# Patient Record
Sex: MALE | Race: OTHER
[De-identification: names, ages, dates, MRNs, and addresses within clinical notes are randomized per-mention and may not be internally consistent; named-entity substitution may affect disease eponyms.]

---

## 2018-01-23 ENCOUNTER — HOSPITAL ENCOUNTER (EMERGENCY)
Dept: HOSPITAL 8 - ED | Age: 58
Discharge: HOME | End: 2018-01-23
Payer: COMMERCIAL

## 2018-01-23 VITALS — BODY MASS INDEX: 28.44 KG/M2 | WEIGHT: 160.5 LBS | HEIGHT: 63 IN

## 2018-01-23 VITALS — SYSTOLIC BLOOD PRESSURE: 154 MMHG | DIASTOLIC BLOOD PRESSURE: 89 MMHG

## 2018-01-23 DIAGNOSIS — R10.30: Primary | ICD-10-CM

## 2018-01-23 LAB
ALBUMIN SERPL-MCNC: 4 G/DL (ref 3.4–5)
ALP SERPL-CCNC: 119 U/L (ref 45–117)
ALT SERPL-CCNC: 34 U/L (ref 12–78)
ANION GAP SERPL CALC-SCNC: 6 MMOL/L (ref 5–15)
BASOPHILS # BLD AUTO: 0.06 X10^3/UL (ref 0–0.1)
BASOPHILS NFR BLD AUTO: 1 % (ref 0–1)
BILIRUB SERPL-MCNC: 1 MG/DL (ref 0.2–1)
CALCIUM SERPL-MCNC: 8.3 MG/DL (ref 8.5–10.1)
CHLORIDE SERPL-SCNC: 106 MMOL/L (ref 98–107)
CREAT SERPL-MCNC: 0.67 MG/DL (ref 0.7–1.3)
CULTURE INDICATED?: NO
EOSINOPHIL # BLD AUTO: 0.06 X10^3/UL (ref 0–0.4)
EOSINOPHIL NFR BLD AUTO: 1 % (ref 1–7)
ERYTHROCYTE [DISTWIDTH] IN BLOOD BY AUTOMATED COUNT: 13.9 % (ref 9.4–14.8)
LYMPHOCYTES # BLD AUTO: 1.28 X10^3/UL (ref 1–3.4)
LYMPHOCYTES NFR BLD AUTO: 13 % (ref 22–44)
MCH RBC QN AUTO: 30.8 PG (ref 27.5–34.5)
MCHC RBC AUTO-ENTMCNC: 34 G/DL (ref 33.2–36.2)
MCV RBC AUTO: 90.4 FL (ref 81–97)
MD: NO
MICROSCOPIC: (no result)
MONOCYTES # BLD AUTO: 0.58 X10^3/UL (ref 0.2–0.8)
MONOCYTES NFR BLD AUTO: 6 % (ref 2–9)
NEUTROPHILS # BLD AUTO: 7.61 X10^3/UL (ref 1.8–6.8)
NEUTROPHILS NFR BLD AUTO: 79 % (ref 42–75)
PLATELET # BLD AUTO: 242 X10^3/UL (ref 130–400)
PMV BLD AUTO: 7.5 FL (ref 7.4–10.4)
PROT SERPL-MCNC: 8.1 G/DL (ref 6.4–8.2)
RBC # BLD AUTO: 5.44 X10^6/UL (ref 4.38–5.82)

## 2018-01-23 PROCEDURE — 74177 CT ABD & PELVIS W/CONTRAST: CPT

## 2018-01-23 PROCEDURE — 96376 TX/PRO/DX INJ SAME DRUG ADON: CPT

## 2018-01-23 PROCEDURE — 85025 COMPLETE CBC W/AUTO DIFF WBC: CPT

## 2018-01-23 PROCEDURE — 96361 HYDRATE IV INFUSION ADD-ON: CPT

## 2018-01-23 PROCEDURE — 96374 THER/PROPH/DIAG INJ IV PUSH: CPT

## 2018-01-23 PROCEDURE — 81001 URINALYSIS AUTO W/SCOPE: CPT

## 2018-01-23 PROCEDURE — 36415 COLL VENOUS BLD VENIPUNCTURE: CPT

## 2018-01-23 PROCEDURE — 80053 COMPREHEN METABOLIC PANEL: CPT

## 2018-01-23 PROCEDURE — 99285 EMERGENCY DEPT VISIT HI MDM: CPT

## 2018-01-23 PROCEDURE — 96375 TX/PRO/DX INJ NEW DRUG ADDON: CPT

## 2018-01-23 RX ADMIN — MORPHINE SULFATE PRN MG: 4 INJECTION INTRAVENOUS at 11:26

## 2018-01-23 RX ADMIN — MORPHINE SULFATE PRN MG: 4 INJECTION INTRAVENOUS at 10:10

## 2018-02-01 ENCOUNTER — HOSPITAL ENCOUNTER (INPATIENT)
Dept: HOSPITAL 8 - ORIP | Age: 58
LOS: 5 days | Discharge: HOME | DRG: 331 | End: 2018-02-06
Attending: SURGERY | Admitting: SURGERY
Payer: COMMERCIAL

## 2018-02-01 VITALS — BODY MASS INDEX: 26.95 KG/M2 | HEIGHT: 63 IN | WEIGHT: 152.12 LBS

## 2018-02-01 VITALS — DIASTOLIC BLOOD PRESSURE: 98 MMHG | SYSTOLIC BLOOD PRESSURE: 159 MMHG

## 2018-02-01 DIAGNOSIS — K56.699: Primary | ICD-10-CM

## 2018-02-01 PROCEDURE — 0DBE0ZZ EXCISION OF LARGE INTESTINE, OPEN APPROACH: ICD-10-PCS | Performed by: SURGERY

## 2018-02-01 PROCEDURE — S0074 INJECTION, CEFOTETAN DISODIU: HCPCS

## 2018-02-01 PROCEDURE — 80048 BASIC METABOLIC PNL TOTAL CA: CPT

## 2018-02-01 PROCEDURE — 88307 TISSUE EXAM BY PATHOLOGIST: CPT

## 2018-02-01 PROCEDURE — 86900 BLOOD TYPING SEROLOGIC ABO: CPT

## 2018-02-01 PROCEDURE — 86850 RBC ANTIBODY SCREEN: CPT

## 2018-02-01 PROCEDURE — 85025 COMPLETE CBC W/AUTO DIFF WBC: CPT

## 2018-02-01 PROCEDURE — C1765 ADHESION BARRIER: HCPCS

## 2018-02-01 PROCEDURE — 88305 TISSUE EXAM BY PATHOLOGIST: CPT

## 2018-02-01 PROCEDURE — 83735 ASSAY OF MAGNESIUM: CPT

## 2018-02-01 PROCEDURE — 82040 ASSAY OF SERUM ALBUMIN: CPT

## 2018-02-01 PROCEDURE — 36415 COLL VENOUS BLD VENIPUNCTURE: CPT

## 2018-02-01 PROCEDURE — 81001 URINALYSIS AUTO W/SCOPE: CPT

## 2018-02-01 RX ADMIN — MEPERIDINE HYDROCHLORIDE PRN MG: 25 INJECTION, SOLUTION INTRAMUSCULAR; INTRAVENOUS; SUBCUTANEOUS at 17:40

## 2018-02-01 RX ADMIN — HYDROMORPHONE HYDROCHLORIDE PRN MG: 1 INJECTION, SOLUTION INTRAMUSCULAR; INTRAVENOUS; SUBCUTANEOUS at 18:40

## 2018-02-01 RX ADMIN — ACETAMINOPHEN SCH MG: 325 TABLET, FILM COATED ORAL at 21:55

## 2018-02-01 RX ADMIN — HYDROMORPHONE HYDROCHLORIDE PRN MG: 1 INJECTION, SOLUTION INTRAMUSCULAR; INTRAVENOUS; SUBCUTANEOUS at 18:50

## 2018-02-01 RX ADMIN — MEPERIDINE HYDROCHLORIDE PRN MG: 25 INJECTION, SOLUTION INTRAMUSCULAR; INTRAVENOUS; SUBCUTANEOUS at 19:10

## 2018-02-01 RX ADMIN — MEPERIDINE HYDROCHLORIDE PRN MG: 25 INJECTION, SOLUTION INTRAMUSCULAR; INTRAVENOUS; SUBCUTANEOUS at 19:00

## 2018-02-01 RX ADMIN — MEPERIDINE HYDROCHLORIDE PRN MG: 25 INJECTION, SOLUTION INTRAMUSCULAR; INTRAVENOUS; SUBCUTANEOUS at 18:10

## 2018-02-01 RX ADMIN — KETOROLAC TROMETHAMINE SCH MG: 30 INJECTION, SOLUTION INTRAMUSCULAR at 21:55

## 2018-02-01 RX ADMIN — HYDROMORPHONE HYDROCHLORIDE PRN MG: 1 INJECTION, SOLUTION INTRAMUSCULAR; INTRAVENOUS; SUBCUTANEOUS at 18:20

## 2018-02-02 VITALS — SYSTOLIC BLOOD PRESSURE: 138 MMHG | DIASTOLIC BLOOD PRESSURE: 85 MMHG

## 2018-02-02 VITALS — DIASTOLIC BLOOD PRESSURE: 72 MMHG | SYSTOLIC BLOOD PRESSURE: 118 MMHG

## 2018-02-02 VITALS — DIASTOLIC BLOOD PRESSURE: 53 MMHG | SYSTOLIC BLOOD PRESSURE: 92 MMHG

## 2018-02-02 VITALS — DIASTOLIC BLOOD PRESSURE: 58 MMHG | SYSTOLIC BLOOD PRESSURE: 94 MMHG

## 2018-02-02 VITALS — SYSTOLIC BLOOD PRESSURE: 93 MMHG | DIASTOLIC BLOOD PRESSURE: 53 MMHG

## 2018-02-02 LAB
ALBUMIN SERPL-MCNC: 2.8 G/DL (ref 3.4–5)
ANION GAP SERPL CALC-SCNC: 9 MMOL/L (ref 5–15)
BASOPHILS # BLD AUTO: 0.01 X10^3/UL (ref 0–0.1)
BASOPHILS NFR BLD AUTO: 0 % (ref 0–1)
CALCIUM SERPL-MCNC: 7.7 MG/DL (ref 8.5–10.1)
CHLORIDE SERPL-SCNC: 101 MMOL/L (ref 98–107)
CREAT SERPL-MCNC: 0.73 MG/DL (ref 0.7–1.3)
EOSINOPHIL # BLD AUTO: 0.01 X10^3/UL (ref 0–0.4)
EOSINOPHIL NFR BLD AUTO: 0 % (ref 1–7)
ERYTHROCYTE [DISTWIDTH] IN BLOOD BY AUTOMATED COUNT: 13.6 % (ref 9.4–14.8)
LYMPHOCYTES # BLD AUTO: 0.7 X10^3/UL (ref 1–3.4)
LYMPHOCYTES NFR BLD AUTO: 7 % (ref 22–44)
MCH RBC QN AUTO: 30.6 PG (ref 27.5–34.5)
MCHC RBC AUTO-ENTMCNC: 34 G/DL (ref 33.2–36.2)
MCV RBC AUTO: 90.1 FL (ref 81–97)
MD: NO
MONOCYTES # BLD AUTO: 0.61 X10^3/UL (ref 0.2–0.8)
MONOCYTES NFR BLD AUTO: 6 % (ref 2–9)
NEUTROPHILS # BLD AUTO: 9.21 X10^3/UL (ref 1.8–6.8)
NEUTROPHILS NFR BLD AUTO: 87 % (ref 42–75)
PLATELET # BLD AUTO: 261 X10^3/UL (ref 130–400)
PMV BLD AUTO: 7.8 FL (ref 7.4–10.4)
RBC # BLD AUTO: 4.69 X10^6/UL (ref 4.38–5.82)

## 2018-02-02 RX ADMIN — MORPHINE SULFATE PRN MG: 4 INJECTION INTRAVENOUS at 06:18

## 2018-02-02 RX ADMIN — TAMSULOSIN HYDROCHLORIDE SCH MG: 0.4 CAPSULE ORAL at 10:54

## 2018-02-02 RX ADMIN — KETOROLAC TROMETHAMINE SCH MG: 30 INJECTION, SOLUTION INTRAMUSCULAR at 17:47

## 2018-02-02 RX ADMIN — ACETAMINOPHEN SCH MG: 325 TABLET, FILM COATED ORAL at 04:03

## 2018-02-02 RX ADMIN — KETOROLAC TROMETHAMINE SCH MG: 30 INJECTION, SOLUTION INTRAMUSCULAR at 04:03

## 2018-02-02 RX ADMIN — SODIUM CHLORIDE, SODIUM LACTATE, POTASSIUM CHLORIDE, AND CALCIUM CHLORIDE SCH MLS/HR: .6; .31; .03; .02 INJECTION, SOLUTION INTRAVENOUS at 11:02

## 2018-02-02 RX ADMIN — ENOXAPARIN SODIUM SCH MG: 40 INJECTION SUBCUTANEOUS at 10:55

## 2018-02-02 RX ADMIN — KETOROLAC TROMETHAMINE SCH MG: 30 INJECTION, SOLUTION INTRAMUSCULAR at 10:55

## 2018-02-02 RX ADMIN — SODIUM CHLORIDE, SODIUM LACTATE, POTASSIUM CHLORIDE, AND CALCIUM CHLORIDE SCH MLS/HR: .6; .31; .03; .02 INJECTION, SOLUTION INTRAVENOUS at 22:49

## 2018-02-02 RX ADMIN — SODIUM CHLORIDE, SODIUM LACTATE, POTASSIUM CHLORIDE, AND CALCIUM CHLORIDE SCH MLS/HR: .6; .31; .03; .02 INJECTION, SOLUTION INTRAVENOUS at 03:51

## 2018-02-02 RX ADMIN — MORPHINE SULFATE PRN MG: 4 INJECTION INTRAVENOUS at 14:19

## 2018-02-02 RX ADMIN — ACETAMINOPHEN SCH MG: 325 TABLET, FILM COATED ORAL at 17:47

## 2018-02-02 RX ADMIN — ACETAMINOPHEN SCH MG: 325 TABLET, FILM COATED ORAL at 10:55

## 2018-02-03 VITALS — SYSTOLIC BLOOD PRESSURE: 90 MMHG | DIASTOLIC BLOOD PRESSURE: 50 MMHG

## 2018-02-03 VITALS — SYSTOLIC BLOOD PRESSURE: 107 MMHG | DIASTOLIC BLOOD PRESSURE: 67 MMHG

## 2018-02-03 VITALS — SYSTOLIC BLOOD PRESSURE: 103 MMHG | DIASTOLIC BLOOD PRESSURE: 64 MMHG

## 2018-02-03 VITALS — SYSTOLIC BLOOD PRESSURE: 98 MMHG | DIASTOLIC BLOOD PRESSURE: 58 MMHG

## 2018-02-03 LAB
<PLATELET ESTIMATE>: ADEQUATE
<PLT MORPHOLOGY>: (no result)
ANION GAP SERPL CALC-SCNC: 7 MMOL/L (ref 5–15)
BAND#(MANUAL): 0.53 X10^3/UL
BILIRUB DIRECT SERPL-MCNC: NORMAL MG/DL
CALCIUM SERPL-MCNC: 7.6 MG/DL (ref 8.5–10.1)
CHLORIDE SERPL-SCNC: 101 MMOL/L (ref 98–107)
CREAT SERPL-MCNC: 0.85 MG/DL (ref 0.7–1.3)
EOS#(MANUAL): 0.18 X10^3/UL (ref 0–0.4)
EOS% (MANUAL): 1 % (ref 1–7)
ERYTHROCYTE [DISTWIDTH] IN BLOOD BY AUTOMATED COUNT: 13.6 % (ref 9.4–14.8)
LYMPH#(MANUAL): 0.88 X10^3/UL (ref 1–3.4)
LYMPHS% (MANUAL): 5 % (ref 22–44)
MCH RBC QN AUTO: 30.7 PG (ref 27.5–34.5)
MCHC RBC AUTO-ENTMCNC: 33.7 G/DL (ref 33.2–36.2)
MCV RBC AUTO: 91.3 FL (ref 81–97)
MD: YES
MICROSCOPIC: (no result)
MONOS#(MANUAL): 0.53 X10^3/UL (ref 0.3–2.7)
MONOS% (MANUAL): 3 % (ref 2–9)
NEUTS BAND NFR BLD: 3 % (ref 0–7)
PLATELET # BLD AUTO: 214 X10^3/UL (ref 130–400)
PMV BLD AUTO: 7.3 FL (ref 7.4–10.4)
RBC # BLD AUTO: 4.05 X10^6/UL (ref 4.38–5.82)
SEG#(MANUAL): 15.49 X10^3/UL (ref 1.8–6.8)
SEGS% (MANUAL): 88 % (ref 42–75)

## 2018-02-03 RX ADMIN — ACETAMINOPHEN SCH MG: 325 TABLET, FILM COATED ORAL at 05:44

## 2018-02-03 RX ADMIN — ACETAMINOPHEN SCH MG: 325 TABLET, FILM COATED ORAL at 23:54

## 2018-02-03 RX ADMIN — TAMSULOSIN HYDROCHLORIDE SCH MG: 0.4 CAPSULE ORAL at 08:03

## 2018-02-03 RX ADMIN — ENOXAPARIN SODIUM SCH MG: 40 INJECTION SUBCUTANEOUS at 11:44

## 2018-02-03 RX ADMIN — ACETAMINOPHEN SCH MG: 325 TABLET, FILM COATED ORAL at 00:09

## 2018-02-03 RX ADMIN — KETOROLAC TROMETHAMINE SCH MG: 30 INJECTION, SOLUTION INTRAMUSCULAR at 00:09

## 2018-02-03 RX ADMIN — KETOROLAC TROMETHAMINE SCH MG: 30 INJECTION, SOLUTION INTRAMUSCULAR at 23:54

## 2018-02-03 RX ADMIN — ACETAMINOPHEN SCH MG: 325 TABLET, FILM COATED ORAL at 17:52

## 2018-02-03 RX ADMIN — ACETAMINOPHEN SCH MG: 325 TABLET, FILM COATED ORAL at 11:46

## 2018-02-03 RX ADMIN — KETOROLAC TROMETHAMINE SCH MG: 30 INJECTION, SOLUTION INTRAMUSCULAR at 11:46

## 2018-02-03 RX ADMIN — KETOROLAC TROMETHAMINE SCH MG: 30 INJECTION, SOLUTION INTRAMUSCULAR at 17:52

## 2018-02-03 RX ADMIN — SODIUM CHLORIDE, SODIUM LACTATE, POTASSIUM CHLORIDE, AND CALCIUM CHLORIDE SCH MLS/HR: .6; .31; .03; .02 INJECTION, SOLUTION INTRAVENOUS at 17:52

## 2018-02-03 RX ADMIN — KETOROLAC TROMETHAMINE SCH MG: 30 INJECTION, SOLUTION INTRAMUSCULAR at 05:44

## 2018-02-03 RX ADMIN — SODIUM CHLORIDE, SODIUM LACTATE, POTASSIUM CHLORIDE, AND CALCIUM CHLORIDE SCH MLS/HR: .6; .31; .03; .02 INJECTION, SOLUTION INTRAVENOUS at 08:02

## 2018-02-04 VITALS — DIASTOLIC BLOOD PRESSURE: 72 MMHG | SYSTOLIC BLOOD PRESSURE: 115 MMHG

## 2018-02-04 VITALS — DIASTOLIC BLOOD PRESSURE: 70 MMHG | SYSTOLIC BLOOD PRESSURE: 110 MMHG

## 2018-02-04 VITALS — DIASTOLIC BLOOD PRESSURE: 71 MMHG | SYSTOLIC BLOOD PRESSURE: 115 MMHG

## 2018-02-04 VITALS — DIASTOLIC BLOOD PRESSURE: 73 MMHG | SYSTOLIC BLOOD PRESSURE: 112 MMHG

## 2018-02-04 LAB
ANION GAP SERPL CALC-SCNC: 7 MMOL/L (ref 5–15)
BASOPHILS # BLD AUTO: 0.02 X10^3/UL (ref 0–0.1)
BASOPHILS NFR BLD AUTO: 0 % (ref 0–1)
CALCIUM SERPL-MCNC: 7.9 MG/DL (ref 8.5–10.1)
CHLORIDE SERPL-SCNC: 104 MMOL/L (ref 98–107)
CREAT SERPL-MCNC: 0.68 MG/DL (ref 0.7–1.3)
EOSINOPHIL # BLD AUTO: 0.44 X10^3/UL (ref 0–0.4)
EOSINOPHIL NFR BLD AUTO: 3 % (ref 1–7)
ERYTHROCYTE [DISTWIDTH] IN BLOOD BY AUTOMATED COUNT: 13.5 % (ref 9.4–14.8)
LYMPHOCYTES # BLD AUTO: 1.26 X10^3/UL (ref 1–3.4)
LYMPHOCYTES NFR BLD AUTO: 8 % (ref 22–44)
MCH RBC QN AUTO: 30.8 PG (ref 27.5–34.5)
MCHC RBC AUTO-ENTMCNC: 34.2 G/DL (ref 33.2–36.2)
MCV RBC AUTO: 90.2 FL (ref 81–97)
MD: NO
MONOCYTES # BLD AUTO: 0.71 X10^3/UL (ref 0.2–0.8)
MONOCYTES NFR BLD AUTO: 5 % (ref 2–9)
NEUTROPHILS # BLD AUTO: 13.13 X10^3/UL (ref 1.8–6.8)
NEUTROPHILS NFR BLD AUTO: 84 % (ref 42–75)
PLATELET # BLD AUTO: 210 X10^3/UL (ref 130–400)
PMV BLD AUTO: 7.4 FL (ref 7.4–10.4)
RBC # BLD AUTO: 3.64 X10^6/UL (ref 4.38–5.82)

## 2018-02-04 RX ADMIN — KETOROLAC TROMETHAMINE SCH MG: 30 INJECTION, SOLUTION INTRAMUSCULAR at 17:53

## 2018-02-04 RX ADMIN — SODIUM CHLORIDE, PRESERVATIVE FREE SCH ML: 5 INJECTION INTRAVENOUS at 22:05

## 2018-02-04 RX ADMIN — ACETAMINOPHEN SCH MG: 325 TABLET, FILM COATED ORAL at 11:58

## 2018-02-04 RX ADMIN — OXYCODONE HYDROCHLORIDE AND ACETAMINOPHEN PRN TAB: 5; 325 TABLET ORAL at 17:53

## 2018-02-04 RX ADMIN — TAMSULOSIN HYDROCHLORIDE SCH MG: 0.4 CAPSULE ORAL at 07:51

## 2018-02-04 RX ADMIN — ACETAMINOPHEN SCH MG: 325 TABLET, FILM COATED ORAL at 23:25

## 2018-02-04 RX ADMIN — ACETAMINOPHEN SCH MG: 325 TABLET, FILM COATED ORAL at 05:53

## 2018-02-04 RX ADMIN — ENOXAPARIN SODIUM SCH MG: 40 INJECTION SUBCUTANEOUS at 11:57

## 2018-02-04 RX ADMIN — OXYCODONE HYDROCHLORIDE AND ACETAMINOPHEN PRN TAB: 5; 325 TABLET ORAL at 22:05

## 2018-02-04 RX ADMIN — SODIUM CHLORIDE, SODIUM LACTATE, POTASSIUM CHLORIDE, AND CALCIUM CHLORIDE SCH MLS/HR: .6; .31; .03; .02 INJECTION, SOLUTION INTRAVENOUS at 01:54

## 2018-02-04 RX ADMIN — ACETAMINOPHEN SCH MG: 325 TABLET, FILM COATED ORAL at 18:03

## 2018-02-04 RX ADMIN — KETOROLAC TROMETHAMINE SCH MG: 30 INJECTION, SOLUTION INTRAMUSCULAR at 11:58

## 2018-02-04 RX ADMIN — KETOROLAC TROMETHAMINE SCH MG: 30 INJECTION, SOLUTION INTRAMUSCULAR at 05:53

## 2018-02-04 RX ADMIN — OXYCODONE HYDROCHLORIDE AND ACETAMINOPHEN PRN TAB: 5; 325 TABLET ORAL at 11:58

## 2018-02-05 VITALS — SYSTOLIC BLOOD PRESSURE: 117 MMHG | DIASTOLIC BLOOD PRESSURE: 67 MMHG

## 2018-02-05 VITALS — DIASTOLIC BLOOD PRESSURE: 74 MMHG | SYSTOLIC BLOOD PRESSURE: 130 MMHG

## 2018-02-05 VITALS — DIASTOLIC BLOOD PRESSURE: 75 MMHG | SYSTOLIC BLOOD PRESSURE: 117 MMHG

## 2018-02-05 VITALS — SYSTOLIC BLOOD PRESSURE: 121 MMHG | DIASTOLIC BLOOD PRESSURE: 81 MMHG

## 2018-02-05 LAB
ANION GAP SERPL CALC-SCNC: 6 MMOL/L (ref 5–15)
BASOPHILS # BLD AUTO: 0.02 X10^3/UL (ref 0–0.1)
BASOPHILS NFR BLD AUTO: 0 % (ref 0–1)
CALCIUM SERPL-MCNC: 7.5 MG/DL (ref 8.5–10.1)
CHLORIDE SERPL-SCNC: 103 MMOL/L (ref 98–107)
CREAT SERPL-MCNC: 0.64 MG/DL (ref 0.7–1.3)
EOSINOPHIL # BLD AUTO: 0.6 X10^3/UL (ref 0–0.4)
EOSINOPHIL NFR BLD AUTO: 5 % (ref 1–7)
ERYTHROCYTE [DISTWIDTH] IN BLOOD BY AUTOMATED COUNT: 13.8 % (ref 9.4–14.8)
LYMPHOCYTES # BLD AUTO: 1.25 X10^3/UL (ref 1–3.4)
LYMPHOCYTES NFR BLD AUTO: 11 % (ref 22–44)
MCH RBC QN AUTO: 30.9 PG (ref 27.5–34.5)
MCHC RBC AUTO-ENTMCNC: 33.8 G/DL (ref 33.2–36.2)
MCV RBC AUTO: 91.3 FL (ref 81–97)
MD: NO
MONOCYTES # BLD AUTO: 0.81 X10^3/UL (ref 0.2–0.8)
MONOCYTES NFR BLD AUTO: 7 % (ref 2–9)
NEUTROPHILS # BLD AUTO: 8.51 X10^3/UL (ref 1.8–6.8)
NEUTROPHILS NFR BLD AUTO: 76 % (ref 42–75)
PLATELET # BLD AUTO: 260 X10^3/UL (ref 130–400)
PMV BLD AUTO: 7.1 FL (ref 7.4–10.4)
RBC # BLD AUTO: 3.64 X10^6/UL (ref 4.38–5.82)

## 2018-02-05 RX ADMIN — ACETAMINOPHEN SCH MG: 325 TABLET, FILM COATED ORAL at 05:23

## 2018-02-05 RX ADMIN — OXYCODONE HYDROCHLORIDE AND ACETAMINOPHEN PRN TAB: 5; 325 TABLET ORAL at 16:53

## 2018-02-05 RX ADMIN — SODIUM CHLORIDE, PRESERVATIVE FREE SCH ML: 5 INJECTION INTRAVENOUS at 08:33

## 2018-02-05 RX ADMIN — ACETAMINOPHEN SCH MG: 325 TABLET, FILM COATED ORAL at 17:58

## 2018-02-05 RX ADMIN — SODIUM CHLORIDE, PRESERVATIVE FREE SCH ML: 5 INJECTION INTRAVENOUS at 20:41

## 2018-02-05 RX ADMIN — ACETAMINOPHEN SCH MG: 325 TABLET, FILM COATED ORAL at 12:12

## 2018-02-05 RX ADMIN — TAMSULOSIN HYDROCHLORIDE SCH MG: 0.4 CAPSULE ORAL at 08:33

## 2018-02-05 RX ADMIN — POTASSIUM CHLORIDE SCH MEQ: 20 TABLET, EXTENDED RELEASE ORAL at 20:41

## 2018-02-05 RX ADMIN — POTASSIUM CHLORIDE SCH MEQ: 20 TABLET, EXTENDED RELEASE ORAL at 11:19

## 2018-02-05 RX ADMIN — OXYCODONE HYDROCHLORIDE AND ACETAMINOPHEN PRN TAB: 5; 325 TABLET ORAL at 12:21

## 2018-02-05 RX ADMIN — OXYCODONE HYDROCHLORIDE AND ACETAMINOPHEN PRN TAB: 5; 325 TABLET ORAL at 06:06

## 2018-02-05 RX ADMIN — ENOXAPARIN SODIUM SCH MG: 40 INJECTION SUBCUTANEOUS at 11:19

## 2018-02-06 VITALS — SYSTOLIC BLOOD PRESSURE: 113 MMHG | DIASTOLIC BLOOD PRESSURE: 71 MMHG

## 2018-02-06 VITALS — SYSTOLIC BLOOD PRESSURE: 132 MMHG | DIASTOLIC BLOOD PRESSURE: 78 MMHG

## 2018-02-06 VITALS — SYSTOLIC BLOOD PRESSURE: 105 MMHG | DIASTOLIC BLOOD PRESSURE: 63 MMHG

## 2018-02-06 LAB
ANION GAP SERPL CALC-SCNC: 7 MMOL/L (ref 5–15)
BASOPHILS # BLD AUTO: 0.02 X10^3/UL (ref 0–0.1)
BASOPHILS NFR BLD AUTO: 0 % (ref 0–1)
CALCIUM SERPL-MCNC: 8 MG/DL (ref 8.5–10.1)
CHLORIDE SERPL-SCNC: 103 MMOL/L (ref 98–107)
CREAT SERPL-MCNC: 0.64 MG/DL (ref 0.7–1.3)
EOSINOPHIL # BLD AUTO: 0.46 X10^3/UL (ref 0–0.4)
EOSINOPHIL NFR BLD AUTO: 6 % (ref 1–7)
ERYTHROCYTE [DISTWIDTH] IN BLOOD BY AUTOMATED COUNT: 13.8 % (ref 9.4–14.8)
LYMPHOCYTES # BLD AUTO: 1.01 X10^3/UL (ref 1–3.4)
LYMPHOCYTES NFR BLD AUTO: 13 % (ref 22–44)
MCH RBC QN AUTO: 30.6 PG (ref 27.5–34.5)
MCHC RBC AUTO-ENTMCNC: 34 G/DL (ref 33.2–36.2)
MCV RBC AUTO: 90.1 FL (ref 81–97)
MD: NO
MONOCYTES # BLD AUTO: 0.59 X10^3/UL (ref 0.2–0.8)
MONOCYTES NFR BLD AUTO: 8 % (ref 2–9)
NEUTROPHILS # BLD AUTO: 5.55 X10^3/UL (ref 1.8–6.8)
NEUTROPHILS NFR BLD AUTO: 73 % (ref 42–75)
PLATELET # BLD AUTO: 317 X10^3/UL (ref 130–400)
PMV BLD AUTO: 7.1 FL (ref 7.4–10.4)
RBC # BLD AUTO: 3.94 X10^6/UL (ref 4.38–5.82)

## 2018-02-06 RX ADMIN — ACETAMINOPHEN SCH MG: 325 TABLET, FILM COATED ORAL at 11:27

## 2018-02-06 RX ADMIN — OXYCODONE HYDROCHLORIDE AND ACETAMINOPHEN PRN TAB: 5; 325 TABLET ORAL at 04:45

## 2018-02-06 RX ADMIN — ACETAMINOPHEN SCH MG: 325 TABLET, FILM COATED ORAL at 06:00

## 2018-02-06 RX ADMIN — ACETAMINOPHEN SCH MG: 325 TABLET, FILM COATED ORAL at 00:00

## 2018-02-06 RX ADMIN — POTASSIUM CHLORIDE SCH MEQ: 20 TABLET, EXTENDED RELEASE ORAL at 08:54

## 2018-02-06 RX ADMIN — SODIUM CHLORIDE, PRESERVATIVE FREE SCH ML: 5 INJECTION INTRAVENOUS at 08:54

## 2018-02-06 RX ADMIN — OXYCODONE HYDROCHLORIDE AND ACETAMINOPHEN PRN TAB: 5; 325 TABLET ORAL at 15:52

## 2018-02-06 RX ADMIN — ENOXAPARIN SODIUM SCH MG: 40 INJECTION SUBCUTANEOUS at 11:27

## 2018-02-06 RX ADMIN — TAMSULOSIN HYDROCHLORIDE SCH MG: 0.4 CAPSULE ORAL at 08:54

## 2018-02-06 RX ADMIN — ACETAMINOPHEN SCH MG: 325 TABLET, FILM COATED ORAL at 17:22

## 2018-02-09 ENCOUNTER — HOSPITAL ENCOUNTER (INPATIENT)
Dept: HOSPITAL 8 - OR | Age: 58
LOS: 11 days | Discharge: NURSING, REHAB OR LONG-TERM CARE | DRG: 853 | End: 2018-02-20
Attending: SURGERY | Admitting: SURGERY
Payer: COMMERCIAL

## 2018-02-09 VITALS — BODY MASS INDEX: 23.87 KG/M2 | WEIGHT: 134.7 LBS | HEIGHT: 63 IN

## 2018-02-09 DIAGNOSIS — Z87.891: ICD-10-CM

## 2018-02-09 DIAGNOSIS — B96.6: ICD-10-CM

## 2018-02-09 DIAGNOSIS — E46: ICD-10-CM

## 2018-02-09 DIAGNOSIS — K40.90: ICD-10-CM

## 2018-02-09 DIAGNOSIS — K66.0: ICD-10-CM

## 2018-02-09 DIAGNOSIS — K56.699: ICD-10-CM

## 2018-02-09 DIAGNOSIS — K65.1: ICD-10-CM

## 2018-02-09 DIAGNOSIS — Z90.49: ICD-10-CM

## 2018-02-09 DIAGNOSIS — A41.9: Primary | ICD-10-CM

## 2018-02-09 DIAGNOSIS — D63.8: ICD-10-CM

## 2018-02-09 DIAGNOSIS — K55.049: ICD-10-CM

## 2018-02-09 DIAGNOSIS — Z93.3: ICD-10-CM

## 2018-02-09 DIAGNOSIS — Z86.73: ICD-10-CM

## 2018-02-09 DIAGNOSIS — K59.39: ICD-10-CM

## 2018-02-09 LAB
ALBUMIN SERPL-MCNC: 2.6 G/DL (ref 3.4–5)
ALP SERPL-CCNC: 101 U/L (ref 45–117)
ALT SERPL-CCNC: 27 U/L (ref 12–78)
ANION GAP SERPL CALC-SCNC: 8 MMOL/L (ref 5–15)
BASOPHILS # BLD AUTO: 0.04 X10^3/UL (ref 0–0.1)
BASOPHILS NFR BLD AUTO: 0 % (ref 0–1)
BILIRUB SERPL-MCNC: 0.5 MG/DL (ref 0.2–1)
CALCIUM SERPL-MCNC: 8.2 MG/DL (ref 8.5–10.1)
CHLORIDE SERPL-SCNC: 101 MMOL/L (ref 98–107)
CREAT SERPL-MCNC: 0.69 MG/DL (ref 0.7–1.3)
CULTURE INDICATED?: NO
EOSINOPHIL # BLD AUTO: 0.34 X10^3/UL (ref 0–0.4)
EOSINOPHIL NFR BLD AUTO: 3 % (ref 1–7)
ERYTHROCYTE [DISTWIDTH] IN BLOOD BY AUTOMATED COUNT: 13.3 % (ref 9.4–14.8)
LYMPHOCYTES # BLD AUTO: 1.14 X10^3/UL (ref 1–3.4)
LYMPHOCYTES NFR BLD AUTO: 8 % (ref 22–44)
MCH RBC QN AUTO: 30.5 PG (ref 27.5–34.5)
MCHC RBC AUTO-ENTMCNC: 33.8 G/DL (ref 33.2–36.2)
MCV RBC AUTO: 90.1 FL (ref 81–97)
MD: NO
MICROSCOPIC: (no result)
MONOCYTES # BLD AUTO: 0.59 X10^3/UL (ref 0.2–0.8)
MONOCYTES NFR BLD AUTO: 4 % (ref 2–9)
NEUTROPHILS # BLD AUTO: 11.57 X10^3/UL (ref 1.8–6.8)
NEUTROPHILS NFR BLD AUTO: 85 % (ref 42–75)
PLATELET # BLD AUTO: 563 X10^3/UL (ref 130–400)
PMV BLD AUTO: 6.3 FL (ref 7.4–10.4)
PROT SERPL-MCNC: 7.1 G/DL (ref 6.4–8.2)
RBC # BLD AUTO: 4.35 X10^6/UL (ref 4.38–5.82)

## 2018-02-09 PROCEDURE — 87116 MYCOBACTERIA CULTURE: CPT

## 2018-02-09 PROCEDURE — 88307 TISSUE EXAM BY PATHOLOGIST: CPT

## 2018-02-09 PROCEDURE — 49406 IMAGE CATH FLUID PERI/RETRO: CPT

## 2018-02-09 PROCEDURE — 85025 COMPLETE CBC W/AUTO DIFF WBC: CPT

## 2018-02-09 PROCEDURE — 49405 IMAGE CATH FLUID COLXN VISC: CPT

## 2018-02-09 PROCEDURE — 87040 BLOOD CULTURE FOR BACTERIA: CPT

## 2018-02-09 PROCEDURE — 80053 COMPREHEN METABOLIC PANEL: CPT

## 2018-02-09 PROCEDURE — 85651 RBC SED RATE NONAUTOMATED: CPT

## 2018-02-09 PROCEDURE — 83735 ASSAY OF MAGNESIUM: CPT

## 2018-02-09 PROCEDURE — 81003 URINALYSIS AUTO W/O SCOPE: CPT

## 2018-02-09 PROCEDURE — 83690 ASSAY OF LIPASE: CPT

## 2018-02-09 PROCEDURE — 84100 ASSAY OF PHOSPHORUS: CPT

## 2018-02-09 PROCEDURE — 36415 COLL VENOUS BLD VENIPUNCTURE: CPT

## 2018-02-09 PROCEDURE — 87077 CULTURE AEROBIC IDENTIFY: CPT

## 2018-02-09 PROCEDURE — 82962 GLUCOSE BLOOD TEST: CPT

## 2018-02-09 PROCEDURE — 96365 THER/PROPH/DIAG IV INF INIT: CPT

## 2018-02-09 PROCEDURE — S0028 INJECTION, FAMOTIDINE, 20 MG: HCPCS

## 2018-02-09 PROCEDURE — C1729 CATH, DRAINAGE: HCPCS

## 2018-02-09 PROCEDURE — 87015 SPECIMEN INFECT AGNT CONCNTJ: CPT

## 2018-02-09 PROCEDURE — 36569 INSJ PICC 5 YR+ W/O IMAGING: CPT

## 2018-02-09 PROCEDURE — C1769 GUIDE WIRE: HCPCS

## 2018-02-09 PROCEDURE — 87186 SC STD MICRODIL/AGAR DIL: CPT

## 2018-02-09 PROCEDURE — 84134 ASSAY OF PREALBUMIN: CPT

## 2018-02-09 PROCEDURE — 76937 US GUIDE VASCULAR ACCESS: CPT

## 2018-02-09 PROCEDURE — 83605 ASSAY OF LACTIC ACID: CPT

## 2018-02-09 PROCEDURE — 77001 FLUOROGUIDE FOR VEIN DEVICE: CPT

## 2018-02-09 PROCEDURE — 86140 C-REACTIVE PROTEIN: CPT

## 2018-02-09 PROCEDURE — 87075 CULTR BACTERIA EXCEPT BLOOD: CPT

## 2018-02-09 PROCEDURE — 87070 CULTURE OTHR SPECIMN AEROBIC: CPT

## 2018-02-09 PROCEDURE — 84478 ASSAY OF TRIGLYCERIDES: CPT

## 2018-02-09 PROCEDURE — 87206 SMEAR FLUORESCENT/ACID STAI: CPT

## 2018-02-09 PROCEDURE — C1751 CATH, INF, PER/CENT/MIDLINE: HCPCS

## 2018-02-09 PROCEDURE — 71045 X-RAY EXAM CHEST 1 VIEW: CPT

## 2018-02-09 PROCEDURE — 74177 CT ABD & PELVIS W/CONTRAST: CPT

## 2018-02-09 PROCEDURE — 87076 CULTURE ANAEROBE IDENT EACH: CPT

## 2018-02-09 PROCEDURE — 80048 BASIC METABOLIC PNL TOTAL CA: CPT

## 2018-02-09 PROCEDURE — 87102 FUNGUS ISOLATION CULTURE: CPT

## 2018-02-09 PROCEDURE — 87205 SMEAR GRAM STAIN: CPT

## 2018-02-10 VITALS — DIASTOLIC BLOOD PRESSURE: 70 MMHG | SYSTOLIC BLOOD PRESSURE: 103 MMHG

## 2018-02-10 VITALS — DIASTOLIC BLOOD PRESSURE: 65 MMHG | SYSTOLIC BLOOD PRESSURE: 101 MMHG

## 2018-02-10 VITALS — SYSTOLIC BLOOD PRESSURE: 102 MMHG | DIASTOLIC BLOOD PRESSURE: 65 MMHG

## 2018-02-10 VITALS — DIASTOLIC BLOOD PRESSURE: 78 MMHG | SYSTOLIC BLOOD PRESSURE: 120 MMHG

## 2018-02-10 VITALS — SYSTOLIC BLOOD PRESSURE: 107 MMHG | DIASTOLIC BLOOD PRESSURE: 71 MMHG

## 2018-02-10 VITALS — SYSTOLIC BLOOD PRESSURE: 101 MMHG | DIASTOLIC BLOOD PRESSURE: 65 MMHG

## 2018-02-10 LAB
ANION GAP SERPL CALC-SCNC: 7 MMOL/L (ref 5–15)
BASOPHILS # BLD AUTO: 0.03 X10^3/UL (ref 0–0.1)
BASOPHILS NFR BLD AUTO: 0 % (ref 0–1)
CALCIUM SERPL-MCNC: 7.8 MG/DL (ref 8.5–10.1)
CHLORIDE SERPL-SCNC: 103 MMOL/L (ref 98–107)
CREAT SERPL-MCNC: 0.64 MG/DL (ref 0.7–1.3)
EOSINOPHIL # BLD AUTO: 0.12 X10^3/UL (ref 0–0.4)
EOSINOPHIL NFR BLD AUTO: 1 % (ref 1–7)
ERYTHROCYTE [DISTWIDTH] IN BLOOD BY AUTOMATED COUNT: 13.3 % (ref 9.4–14.8)
LYMPHOCYTES # BLD AUTO: 0.9 X10^3/UL (ref 1–3.4)
LYMPHOCYTES NFR BLD AUTO: 6 % (ref 22–44)
MCH RBC QN AUTO: 30.7 PG (ref 27.5–34.5)
MCHC RBC AUTO-ENTMCNC: 34 G/DL (ref 33.2–36.2)
MCV RBC AUTO: 90.3 FL (ref 81–97)
MD: NO
MONOCYTES # BLD AUTO: 0.67 X10^3/UL (ref 0.2–0.8)
MONOCYTES NFR BLD AUTO: 5 % (ref 2–9)
NEUTROPHILS # BLD AUTO: 12.34 X10^3/UL (ref 1.8–6.8)
NEUTROPHILS NFR BLD AUTO: 88 % (ref 42–75)
PLATELET # BLD AUTO: 476 X10^3/UL (ref 130–400)
PMV BLD AUTO: 6.7 FL (ref 7.4–10.4)
RBC # BLD AUTO: 3.67 X10^6/UL (ref 4.38–5.82)

## 2018-02-10 PROCEDURE — 02HV33Z INSERTION OF INFUSION DEVICE INTO SUPERIOR VENA CAVA, PERCUTANEOUS APPROACH: ICD-10-PCS | Performed by: RADIOLOGY

## 2018-02-10 PROCEDURE — 0DQM0ZZ REPAIR DESCENDING COLON, OPEN APPROACH: ICD-10-PCS | Performed by: SURGERY

## 2018-02-10 PROCEDURE — 0DBM0ZZ EXCISION OF DESCENDING COLON, OPEN APPROACH: ICD-10-PCS | Performed by: SURGERY

## 2018-02-10 PROCEDURE — B548ZZA ULTRASONOGRAPHY OF SUPERIOR VENA CAVA, GUIDANCE: ICD-10-PCS | Performed by: RADIOLOGY

## 2018-02-10 RX ADMIN — ENOXAPARIN SODIUM SCH MG: 30 INJECTION SUBCUTANEOUS at 13:29

## 2018-02-10 RX ADMIN — MORPHINE SULFATE PRN MG: 10 INJECTION INTRAVENOUS at 04:02

## 2018-02-10 RX ADMIN — SODIUM CHLORIDE, SODIUM LACTATE, POTASSIUM CHLORIDE, AND CALCIUM CHLORIDE SCH MLS/HR: .6; .31; .03; .02 INJECTION, SOLUTION INTRAVENOUS at 14:57

## 2018-02-10 RX ADMIN — METRONIDAZOLE SCH MLS/HR: 500 INJECTION, SOLUTION INTRAVENOUS at 00:55

## 2018-02-10 RX ADMIN — MORPHINE SULFATE PRN MG: 10 INJECTION INTRAVENOUS at 15:01

## 2018-02-10 RX ADMIN — MORPHINE SULFATE PRN MG: 10 INJECTION INTRAVENOUS at 21:36

## 2018-02-10 RX ADMIN — ENOXAPARIN SODIUM SCH MG: 30 INJECTION SUBCUTANEOUS at 00:55

## 2018-02-10 RX ADMIN — MORPHINE SULFATE PRN MG: 10 INJECTION INTRAVENOUS at 00:55

## 2018-02-10 RX ADMIN — FENTANYL CITRATE PRN MCG: 50 INJECTION INTRAMUSCULAR; INTRAVENOUS at 12:02

## 2018-02-10 RX ADMIN — TAZOBACTAM SODIUM AND PIPERACILLIN SODIUM SCH MLS/HR: 375; 3 INJECTION, SOLUTION INTRAVENOUS at 21:28

## 2018-02-10 RX ADMIN — MORPHINE SULFATE PRN MG: 10 INJECTION INTRAVENOUS at 18:08

## 2018-02-10 RX ADMIN — METRONIDAZOLE SCH MLS/HR: 500 INJECTION, SOLUTION INTRAVENOUS at 07:46

## 2018-02-10 RX ADMIN — TAZOBACTAM SODIUM AND PIPERACILLIN SODIUM SCH MLS/HR: 375; 3 INJECTION, SOLUTION INTRAVENOUS at 15:01

## 2018-02-10 RX ADMIN — METRONIDAZOLE SCH MLS/HR: 500 INJECTION, SOLUTION INTRAVENOUS at 16:34

## 2018-02-10 RX ADMIN — MORPHINE SULFATE PRN MG: 10 INJECTION INTRAVENOUS at 07:36

## 2018-02-10 RX ADMIN — FENTANYL CITRATE PRN MCG: 50 INJECTION INTRAMUSCULAR; INTRAVENOUS at 11:53

## 2018-02-11 VITALS — DIASTOLIC BLOOD PRESSURE: 65 MMHG | SYSTOLIC BLOOD PRESSURE: 99 MMHG

## 2018-02-11 VITALS — SYSTOLIC BLOOD PRESSURE: 102 MMHG | DIASTOLIC BLOOD PRESSURE: 71 MMHG

## 2018-02-11 VITALS — DIASTOLIC BLOOD PRESSURE: 74 MMHG | SYSTOLIC BLOOD PRESSURE: 112 MMHG

## 2018-02-11 VITALS — DIASTOLIC BLOOD PRESSURE: 69 MMHG | SYSTOLIC BLOOD PRESSURE: 112 MMHG

## 2018-02-11 LAB
ALBUMIN SERPL-MCNC: 1.7 G/DL (ref 3.4–5)
ALP SERPL-CCNC: 62 U/L (ref 45–117)
ALT SERPL-CCNC: 15 U/L (ref 12–78)
ANION GAP SERPL CALC-SCNC: 6 MMOL/L (ref 5–15)
BASOPHILS # BLD AUTO: 0.03 X10^3/UL (ref 0–0.1)
BASOPHILS NFR BLD AUTO: 0 % (ref 0–1)
BILIRUB SERPL-MCNC: 0.6 MG/DL (ref 0.2–1)
CALCIUM SERPL-MCNC: 7.9 MG/DL (ref 8.5–10.1)
CHLORIDE SERPL-SCNC: 106 MMOL/L (ref 98–107)
CREAT SERPL-MCNC: 0.79 MG/DL (ref 0.7–1.3)
EOSINOPHIL # BLD AUTO: 0 X10^3/UL (ref 0–0.4)
EOSINOPHIL NFR BLD AUTO: 0 % (ref 1–7)
ERYTHROCYTE [DISTWIDTH] IN BLOOD BY AUTOMATED COUNT: 13.7 % (ref 9.4–14.8)
LYMPHOCYTES # BLD AUTO: 0.63 X10^3/UL (ref 1–3.4)
LYMPHOCYTES NFR BLD AUTO: 3 % (ref 22–44)
MCH RBC QN AUTO: 30.1 PG (ref 27.5–34.5)
MCHC RBC AUTO-ENTMCNC: 33.6 G/DL (ref 33.2–36.2)
MCV RBC AUTO: 89.6 FL (ref 81–97)
MD: (no result)
MONOCYTES # BLD AUTO: 0.72 X10^3/UL (ref 0.2–0.8)
MONOCYTES NFR BLD AUTO: 4 % (ref 2–9)
NEUTROPHILS # BLD AUTO: 16.82 X10^3/UL (ref 1.8–6.8)
NEUTROPHILS NFR BLD AUTO: 92 % (ref 42–75)
PLATELET # BLD AUTO: 564 X10^3/UL (ref 130–400)
PMV BLD AUTO: 6.6 FL (ref 7.4–10.4)
PREALB SERPL-MCNC: 6.9 MG/DL (ref 20–40)
PROT SERPL-MCNC: 5.5 G/DL (ref 6.4–8.2)
RBC # BLD AUTO: 3.95 X10^6/UL (ref 4.38–5.82)
TRIGL SERPL-MCNC: 61 MG/DL (ref 50–200)

## 2018-02-11 RX ADMIN — METRONIDAZOLE SCH MLS/HR: 500 INJECTION, SOLUTION INTRAVENOUS at 00:37

## 2018-02-11 RX ADMIN — SODIUM CHLORIDE, SODIUM LACTATE, POTASSIUM CHLORIDE, AND CALCIUM CHLORIDE SCH MLS/HR: .6; .31; .03; .02 INJECTION, SOLUTION INTRAVENOUS at 00:43

## 2018-02-11 RX ADMIN — SODIUM CHLORIDE, SODIUM LACTATE, POTASSIUM CHLORIDE, AND CALCIUM CHLORIDE SCH MLS/HR: .6; .31; .03; .02 INJECTION, SOLUTION INTRAVENOUS at 08:06

## 2018-02-11 RX ADMIN — SODIUM CHLORIDE SCH MLS/HR: 0.45 INJECTION, SOLUTION INTRAVENOUS at 19:16

## 2018-02-11 RX ADMIN — TAZOBACTAM SODIUM AND PIPERACILLIN SODIUM SCH MLS/HR: 375; 3 INJECTION, SOLUTION INTRAVENOUS at 09:21

## 2018-02-11 RX ADMIN — INSULIN HUMAN SCH UNITS: 100 INJECTION, SOLUTION PARENTERAL at 21:31

## 2018-02-11 RX ADMIN — Medication PRN EACH: at 18:17

## 2018-02-11 RX ADMIN — MORPHINE SULFATE PRN MG: 10 INJECTION INTRAVENOUS at 01:52

## 2018-02-11 RX ADMIN — ENOXAPARIN SODIUM SCH MG: 40 INJECTION SUBCUTANEOUS at 20:59

## 2018-02-11 RX ADMIN — TAMSULOSIN HYDROCHLORIDE SCH MG: 0.4 CAPSULE ORAL at 09:21

## 2018-02-11 RX ADMIN — TAZOBACTAM SODIUM AND PIPERACILLIN SODIUM SCH MLS/HR: 375; 3 INJECTION, SOLUTION INTRAVENOUS at 03:37

## 2018-02-11 RX ADMIN — TAZOBACTAM SODIUM AND PIPERACILLIN SODIUM SCH MLS/HR: 375; 3 INJECTION, SOLUTION INTRAVENOUS at 14:53

## 2018-02-11 RX ADMIN — TAZOBACTAM SODIUM AND PIPERACILLIN SODIUM SCH MLS/HR: 375; 3 INJECTION, SOLUTION INTRAVENOUS at 20:59

## 2018-02-11 RX ADMIN — METRONIDAZOLE SCH MLS/HR: 500 INJECTION, SOLUTION INTRAVENOUS at 08:06

## 2018-02-11 RX ADMIN — ENOXAPARIN SODIUM SCH MG: 30 INJECTION SUBCUTANEOUS at 00:37

## 2018-02-11 RX ADMIN — MORPHINE SULFATE PRN MG: 10 INJECTION INTRAVENOUS at 08:22

## 2018-02-12 VITALS — SYSTOLIC BLOOD PRESSURE: 107 MMHG | DIASTOLIC BLOOD PRESSURE: 66 MMHG

## 2018-02-12 VITALS — SYSTOLIC BLOOD PRESSURE: 100 MMHG | DIASTOLIC BLOOD PRESSURE: 62 MMHG

## 2018-02-12 VITALS — DIASTOLIC BLOOD PRESSURE: 54 MMHG | SYSTOLIC BLOOD PRESSURE: 94 MMHG

## 2018-02-12 VITALS — SYSTOLIC BLOOD PRESSURE: 104 MMHG | DIASTOLIC BLOOD PRESSURE: 65 MMHG

## 2018-02-12 LAB
ANION GAP SERPL CALC-SCNC: 3 MMOL/L (ref 5–15)
BASOPHILS # BLD AUTO: 0 X10^3/UL (ref 0–0.1)
BASOPHILS NFR BLD AUTO: 0 % (ref 0–1)
CALCIUM SERPL-MCNC: 7.8 MG/DL (ref 8.5–10.1)
CHLORIDE SERPL-SCNC: 105 MMOL/L (ref 98–107)
CREAT SERPL-MCNC: 0.48 MG/DL (ref 0.7–1.3)
EOSINOPHIL # BLD AUTO: 0.01 X10^3/UL (ref 0–0.4)
EOSINOPHIL NFR BLD AUTO: 0 % (ref 1–7)
ERYTHROCYTE [DISTWIDTH] IN BLOOD BY AUTOMATED COUNT: 13.9 % (ref 9.4–14.8)
LYMPHOCYTES # BLD AUTO: 6.04 X10^3/UL (ref 1–3.4)
LYMPHOCYTES NFR BLD AUTO: 41 % (ref 22–44)
MCH RBC QN AUTO: 30.1 PG (ref 27.5–34.5)
MCHC RBC AUTO-ENTMCNC: 33.4 G/DL (ref 33.2–36.2)
MCV RBC AUTO: 90 FL (ref 81–97)
MD: (no result)
MONOCYTES # BLD AUTO: 0.66 X10^3/UL (ref 0.2–0.8)
MONOCYTES NFR BLD AUTO: 5 % (ref 2–9)
NEUTROPHILS # BLD AUTO: 7.92 X10^3/UL (ref 1.8–6.8)
NEUTROPHILS NFR BLD AUTO: 54 % (ref 42–75)
PLATELET # BLD AUTO: 494 X10^3/UL (ref 130–400)
PMV BLD AUTO: 7.2 FL (ref 7.4–10.4)
RBC # BLD AUTO: 3.21 X10^6/UL (ref 4.38–5.82)

## 2018-02-12 RX ADMIN — TAZOBACTAM SODIUM AND PIPERACILLIN SODIUM SCH MLS/HR: 375; 3 INJECTION, SOLUTION INTRAVENOUS at 20:35

## 2018-02-12 RX ADMIN — ENOXAPARIN SODIUM SCH MG: 40 INJECTION SUBCUTANEOUS at 20:35

## 2018-02-12 RX ADMIN — TAZOBACTAM SODIUM AND PIPERACILLIN SODIUM SCH MLS/HR: 375; 3 INJECTION, SOLUTION INTRAVENOUS at 09:24

## 2018-02-12 RX ADMIN — INSULIN HUMAN SCH UNITS: 100 INJECTION, SOLUTION PARENTERAL at 15:00

## 2018-02-12 RX ADMIN — TAZOBACTAM SODIUM AND PIPERACILLIN SODIUM SCH MLS/HR: 375; 3 INJECTION, SOLUTION INTRAVENOUS at 15:31

## 2018-02-12 RX ADMIN — Medication PRN EACH: at 15:31

## 2018-02-12 RX ADMIN — INSULIN HUMAN SCH UNITS: 100 INJECTION, SOLUTION PARENTERAL at 03:00

## 2018-02-12 RX ADMIN — INSULIN HUMAN SCH UNITS: 100 INJECTION, SOLUTION PARENTERAL at 09:00

## 2018-02-12 RX ADMIN — INSULIN HUMAN SCH UNITS: 100 INJECTION, SOLUTION PARENTERAL at 20:36

## 2018-02-12 RX ADMIN — MORPHINE SULFATE PRN MG: 10 INJECTION INTRAVENOUS at 10:58

## 2018-02-12 RX ADMIN — TAMSULOSIN HYDROCHLORIDE SCH MG: 0.4 CAPSULE ORAL at 09:24

## 2018-02-12 RX ADMIN — TAZOBACTAM SODIUM AND PIPERACILLIN SODIUM SCH MLS/HR: 375; 3 INJECTION, SOLUTION INTRAVENOUS at 03:13

## 2018-02-13 VITALS — DIASTOLIC BLOOD PRESSURE: 68 MMHG | SYSTOLIC BLOOD PRESSURE: 111 MMHG

## 2018-02-13 VITALS — SYSTOLIC BLOOD PRESSURE: 115 MMHG | DIASTOLIC BLOOD PRESSURE: 72 MMHG

## 2018-02-13 VITALS — SYSTOLIC BLOOD PRESSURE: 117 MMHG | DIASTOLIC BLOOD PRESSURE: 67 MMHG

## 2018-02-13 VITALS — DIASTOLIC BLOOD PRESSURE: 74 MMHG | SYSTOLIC BLOOD PRESSURE: 124 MMHG

## 2018-02-13 LAB
ANION GAP SERPL CALC-SCNC: 7 MMOL/L (ref 5–15)
BASOPHILS # BLD AUTO: 0.02 X10^3/UL (ref 0–0.1)
BASOPHILS NFR BLD AUTO: 0 % (ref 0–1)
CALCIUM SERPL-MCNC: 7.3 MG/DL (ref 8.5–10.1)
CHLORIDE SERPL-SCNC: 107 MMOL/L (ref 98–107)
CREAT SERPL-MCNC: 0.36 MG/DL (ref 0.7–1.3)
EOSINOPHIL # BLD AUTO: 0.26 X10^3/UL (ref 0–0.4)
EOSINOPHIL NFR BLD AUTO: 3 % (ref 1–7)
ERYTHROCYTE [DISTWIDTH] IN BLOOD BY AUTOMATED COUNT: 13.7 % (ref 9.4–14.8)
LYMPHOCYTES # BLD AUTO: 0.85 X10^3/UL (ref 1–3.4)
LYMPHOCYTES NFR BLD AUTO: 9 % (ref 22–44)
MCH RBC QN AUTO: 30 PG (ref 27.5–34.5)
MCHC RBC AUTO-ENTMCNC: 33.2 G/DL (ref 33.2–36.2)
MCV RBC AUTO: 90.3 FL (ref 81–97)
MD: NO
MONOCYTES # BLD AUTO: 0.66 X10^3/UL (ref 0.2–0.8)
MONOCYTES NFR BLD AUTO: 7 % (ref 2–9)
NEUTROPHILS # BLD AUTO: 8.02 X10^3/UL (ref 1.8–6.8)
NEUTROPHILS NFR BLD AUTO: 82 % (ref 42–75)
PLATELET # BLD AUTO: 522 X10^3/UL (ref 130–400)
PMV BLD AUTO: 6.8 FL (ref 7.4–10.4)
RBC # BLD AUTO: 3.19 X10^6/UL (ref 4.38–5.82)

## 2018-02-13 RX ADMIN — TAZOBACTAM SODIUM AND PIPERACILLIN SODIUM SCH MLS/HR: 375; 3 INJECTION, SOLUTION INTRAVENOUS at 09:25

## 2018-02-13 RX ADMIN — TAZOBACTAM SODIUM AND PIPERACILLIN SODIUM SCH MLS/HR: 375; 3 INJECTION, SOLUTION INTRAVENOUS at 03:13

## 2018-02-13 RX ADMIN — TAMSULOSIN HYDROCHLORIDE SCH MG: 0.4 CAPSULE ORAL at 09:24

## 2018-02-13 RX ADMIN — TAZOBACTAM SODIUM AND PIPERACILLIN SODIUM SCH MLS/HR: 375; 3 INJECTION, SOLUTION INTRAVENOUS at 15:06

## 2018-02-13 RX ADMIN — TAZOBACTAM SODIUM AND PIPERACILLIN SODIUM SCH MLS/HR: 375; 3 INJECTION, SOLUTION INTRAVENOUS at 22:00

## 2018-02-13 RX ADMIN — INSULIN HUMAN SCH UNITS: 100 INJECTION, SOLUTION PARENTERAL at 15:09

## 2018-02-13 RX ADMIN — Medication PRN EACH: at 16:31

## 2018-02-13 RX ADMIN — ENOXAPARIN SODIUM SCH MG: 40 INJECTION SUBCUTANEOUS at 22:00

## 2018-02-13 RX ADMIN — INSULIN HUMAN SCH UNITS: 100 INJECTION, SOLUTION PARENTERAL at 03:00

## 2018-02-13 RX ADMIN — INSULIN HUMAN SCH UNITS: 100 INJECTION, SOLUTION PARENTERAL at 09:00

## 2018-02-14 VITALS — DIASTOLIC BLOOD PRESSURE: 72 MMHG | SYSTOLIC BLOOD PRESSURE: 109 MMHG

## 2018-02-14 VITALS — DIASTOLIC BLOOD PRESSURE: 74 MMHG | SYSTOLIC BLOOD PRESSURE: 116 MMHG

## 2018-02-14 VITALS — SYSTOLIC BLOOD PRESSURE: 109 MMHG | DIASTOLIC BLOOD PRESSURE: 72 MMHG

## 2018-02-14 VITALS — DIASTOLIC BLOOD PRESSURE: 72 MMHG | SYSTOLIC BLOOD PRESSURE: 107 MMHG

## 2018-02-14 LAB
ANION GAP SERPL CALC-SCNC: 8 MMOL/L (ref 5–15)
BASOPHILS # BLD AUTO: 0.04 X10^3/UL (ref 0–0.1)
BASOPHILS NFR BLD AUTO: 0 % (ref 0–1)
CALCIUM SERPL-MCNC: 7.5 MG/DL (ref 8.5–10.1)
CHLORIDE SERPL-SCNC: 101 MMOL/L (ref 98–107)
CREAT SERPL-MCNC: 0.37 MG/DL (ref 0.7–1.3)
EOSINOPHIL # BLD AUTO: 0.31 X10^3/UL (ref 0–0.4)
EOSINOPHIL NFR BLD AUTO: 3 % (ref 1–7)
ERYTHROCYTE [DISTWIDTH] IN BLOOD BY AUTOMATED COUNT: 14 % (ref 9.4–14.8)
LYMPHOCYTES # BLD AUTO: 0.9 X10^3/UL (ref 1–3.4)
LYMPHOCYTES NFR BLD AUTO: 10 % (ref 22–44)
MCH RBC QN AUTO: 30.4 PG (ref 27.5–34.5)
MCHC RBC AUTO-ENTMCNC: 33.9 G/DL (ref 33.2–36.2)
MCV RBC AUTO: 89.6 FL (ref 81–97)
MD: NO
MONOCYTES # BLD AUTO: 0.93 X10^3/UL (ref 0.2–0.8)
MONOCYTES NFR BLD AUTO: 10 % (ref 2–9)
NEUTROPHILS # BLD AUTO: 6.99 X10^3/UL (ref 1.8–6.8)
NEUTROPHILS NFR BLD AUTO: 76 % (ref 42–75)
PLATELET # BLD AUTO: 645 X10^3/UL (ref 130–400)
PMV BLD AUTO: 6.6 FL (ref 7.4–10.4)
RBC # BLD AUTO: 3.61 X10^6/UL (ref 4.38–5.82)

## 2018-02-14 RX ADMIN — ENOXAPARIN SODIUM SCH MG: 40 INJECTION SUBCUTANEOUS at 20:50

## 2018-02-14 RX ADMIN — TAZOBACTAM SODIUM AND PIPERACILLIN SODIUM SCH MLS/HR: 375; 3 INJECTION, SOLUTION INTRAVENOUS at 04:18

## 2018-02-14 RX ADMIN — MORPHINE SULFATE PRN MG: 10 INJECTION INTRAVENOUS at 13:07

## 2018-02-14 RX ADMIN — TAZOBACTAM SODIUM AND PIPERACILLIN SODIUM SCH MLS/HR: 375; 3 INJECTION, SOLUTION INTRAVENOUS at 20:50

## 2018-02-14 RX ADMIN — TAZOBACTAM SODIUM AND PIPERACILLIN SODIUM SCH MLS/HR: 375; 3 INJECTION, SOLUTION INTRAVENOUS at 09:38

## 2018-02-14 RX ADMIN — TAMSULOSIN HYDROCHLORIDE SCH MG: 0.4 CAPSULE ORAL at 08:31

## 2018-02-14 RX ADMIN — SODIUM CHLORIDE SCH MLS/HR: 0.45 INJECTION, SOLUTION INTRAVENOUS at 18:17

## 2018-02-14 RX ADMIN — TAZOBACTAM SODIUM AND PIPERACILLIN SODIUM SCH MLS/HR: 375; 3 INJECTION, SOLUTION INTRAVENOUS at 15:44

## 2018-02-14 RX ADMIN — Medication PRN EACH: at 18:06

## 2018-02-15 VITALS — DIASTOLIC BLOOD PRESSURE: 71 MMHG | SYSTOLIC BLOOD PRESSURE: 112 MMHG

## 2018-02-15 VITALS — DIASTOLIC BLOOD PRESSURE: 66 MMHG | SYSTOLIC BLOOD PRESSURE: 102 MMHG

## 2018-02-15 VITALS — SYSTOLIC BLOOD PRESSURE: 103 MMHG | DIASTOLIC BLOOD PRESSURE: 68 MMHG

## 2018-02-15 VITALS — SYSTOLIC BLOOD PRESSURE: 114 MMHG | DIASTOLIC BLOOD PRESSURE: 72 MMHG

## 2018-02-15 LAB
ANION GAP SERPL CALC-SCNC: 6 MMOL/L (ref 5–15)
BASOPHILS # BLD AUTO: 0.04 X10^3/UL (ref 0–0.1)
BASOPHILS NFR BLD AUTO: 0 % (ref 0–1)
CALCIUM SERPL-MCNC: 7.7 MG/DL (ref 8.5–10.1)
CHLORIDE SERPL-SCNC: 102 MMOL/L (ref 98–107)
CREAT SERPL-MCNC: 0.39 MG/DL (ref 0.7–1.3)
EOSINOPHIL # BLD AUTO: 0.4 X10^3/UL (ref 0–0.4)
EOSINOPHIL NFR BLD AUTO: 4 % (ref 1–7)
ERYTHROCYTE [DISTWIDTH] IN BLOOD BY AUTOMATED COUNT: 13.6 % (ref 9.4–14.8)
LYMPHOCYTES # BLD AUTO: 1 X10^3/UL (ref 1–3.4)
LYMPHOCYTES NFR BLD AUTO: 10 % (ref 22–44)
MCH RBC QN AUTO: 29.9 PG (ref 27.5–34.5)
MCHC RBC AUTO-ENTMCNC: 33.4 G/DL (ref 33.2–36.2)
MCV RBC AUTO: 89.6 FL (ref 81–97)
MD: NO
MONOCYTES # BLD AUTO: 1.01 X10^3/UL (ref 0.2–0.8)
MONOCYTES NFR BLD AUTO: 10 % (ref 2–9)
NEUTROPHILS # BLD AUTO: 7.66 X10^3/UL (ref 1.8–6.8)
NEUTROPHILS NFR BLD AUTO: 76 % (ref 42–75)
PLATELET # BLD AUTO: 648 X10^3/UL (ref 130–400)
PMV BLD AUTO: 6.7 FL (ref 7.4–10.4)
RBC # BLD AUTO: 3.61 X10^6/UL (ref 4.38–5.82)

## 2018-02-15 RX ADMIN — TAZOBACTAM SODIUM AND PIPERACILLIN SODIUM SCH MLS/HR: 375; 3 INJECTION, SOLUTION INTRAVENOUS at 03:05

## 2018-02-15 RX ADMIN — TAZOBACTAM SODIUM AND PIPERACILLIN SODIUM SCH MLS/HR: 500; 4 INJECTION, SOLUTION INTRAVENOUS at 13:40

## 2018-02-15 RX ADMIN — MICAFUNGIN SODIUM SCH MLS/HR: 20 INJECTION, POWDER, LYOPHILIZED, FOR SOLUTION INTRAVENOUS at 14:58

## 2018-02-15 RX ADMIN — TAMSULOSIN HYDROCHLORIDE SCH MG: 0.4 CAPSULE ORAL at 08:14

## 2018-02-15 RX ADMIN — ENOXAPARIN SODIUM SCH MG: 40 INJECTION SUBCUTANEOUS at 21:08

## 2018-02-15 RX ADMIN — TAZOBACTAM SODIUM AND PIPERACILLIN SODIUM SCH MLS/HR: 500; 4 INJECTION, SOLUTION INTRAVENOUS at 21:08

## 2018-02-15 RX ADMIN — INSULIN HUMAN SCH UNITS: 100 INJECTION, SOLUTION PARENTERAL at 08:00

## 2018-02-15 RX ADMIN — TAZOBACTAM SODIUM AND PIPERACILLIN SODIUM SCH MLS/HR: 375; 3 INJECTION, SOLUTION INTRAVENOUS at 09:05

## 2018-02-16 VITALS — SYSTOLIC BLOOD PRESSURE: 119 MMHG | DIASTOLIC BLOOD PRESSURE: 80 MMHG

## 2018-02-16 VITALS — SYSTOLIC BLOOD PRESSURE: 113 MMHG | DIASTOLIC BLOOD PRESSURE: 70 MMHG

## 2018-02-16 VITALS — SYSTOLIC BLOOD PRESSURE: 116 MMHG | DIASTOLIC BLOOD PRESSURE: 73 MMHG

## 2018-02-16 VITALS — SYSTOLIC BLOOD PRESSURE: 112 MMHG | DIASTOLIC BLOOD PRESSURE: 70 MMHG

## 2018-02-16 LAB
ANION GAP SERPL CALC-SCNC: 7 MMOL/L (ref 5–15)
BASOPHILS # BLD AUTO: 0.04 X10^3/UL (ref 0–0.1)
BASOPHILS NFR BLD AUTO: 0 % (ref 0–1)
CALCIUM SERPL-MCNC: 7.8 MG/DL (ref 8.5–10.1)
CHLORIDE SERPL-SCNC: 103 MMOL/L (ref 98–107)
CREAT SERPL-MCNC: 0.43 MG/DL (ref 0.7–1.3)
EOSINOPHIL # BLD AUTO: 0.58 X10^3/UL (ref 0–0.4)
EOSINOPHIL NFR BLD AUTO: 5 % (ref 1–7)
ERYTHROCYTE [DISTWIDTH] IN BLOOD BY AUTOMATED COUNT: 13.5 % (ref 9.4–14.8)
LYMPHOCYTES # BLD AUTO: 0.87 X10^3/UL (ref 1–3.4)
LYMPHOCYTES NFR BLD AUTO: 8 % (ref 22–44)
MCH RBC QN AUTO: 30.1 PG (ref 27.5–34.5)
MCHC RBC AUTO-ENTMCNC: 33.8 G/DL (ref 33.2–36.2)
MCV RBC AUTO: 89.1 FL (ref 81–97)
MD: NO
MONOCYTES # BLD AUTO: 0.83 X10^3/UL (ref 0.2–0.8)
MONOCYTES NFR BLD AUTO: 8 % (ref 2–9)
NEUTROPHILS # BLD AUTO: 8.56 X10^3/UL (ref 1.8–6.8)
NEUTROPHILS NFR BLD AUTO: 79 % (ref 42–75)
PLATELET # BLD AUTO: 614 X10^3/UL (ref 130–400)
PMV BLD AUTO: 7 FL (ref 7.4–10.4)
RBC # BLD AUTO: 3.59 X10^6/UL (ref 4.38–5.82)

## 2018-02-16 RX ADMIN — TAZOBACTAM SODIUM AND PIPERACILLIN SODIUM SCH MLS/HR: 500; 4 INJECTION, SOLUTION INTRAVENOUS at 04:57

## 2018-02-16 RX ADMIN — MORPHINE SULFATE PRN MG: 10 INJECTION INTRAVENOUS at 11:16

## 2018-02-16 RX ADMIN — TAZOBACTAM SODIUM AND PIPERACILLIN SODIUM SCH MLS/HR: 500; 4 INJECTION, SOLUTION INTRAVENOUS at 21:18

## 2018-02-16 RX ADMIN — INSULIN HUMAN SCH UNITS: 100 INJECTION, SOLUTION PARENTERAL at 07:28

## 2018-02-16 RX ADMIN — TAMSULOSIN HYDROCHLORIDE SCH MG: 0.4 CAPSULE ORAL at 08:08

## 2018-02-16 RX ADMIN — SODIUM CHLORIDE SCH MLS/HR: 0.45 INJECTION, SOLUTION INTRAVENOUS at 17:01

## 2018-02-16 RX ADMIN — MICAFUNGIN SODIUM SCH MLS/HR: 20 INJECTION, POWDER, LYOPHILIZED, FOR SOLUTION INTRAVENOUS at 14:59

## 2018-02-16 RX ADMIN — ENOXAPARIN SODIUM SCH MG: 40 INJECTION SUBCUTANEOUS at 20:30

## 2018-02-16 RX ADMIN — TAZOBACTAM SODIUM AND PIPERACILLIN SODIUM SCH MLS/HR: 500; 4 INJECTION, SOLUTION INTRAVENOUS at 13:56

## 2018-02-17 VITALS — DIASTOLIC BLOOD PRESSURE: 82 MMHG | SYSTOLIC BLOOD PRESSURE: 132 MMHG

## 2018-02-17 VITALS — DIASTOLIC BLOOD PRESSURE: 71 MMHG | SYSTOLIC BLOOD PRESSURE: 111 MMHG

## 2018-02-17 VITALS — SYSTOLIC BLOOD PRESSURE: 110 MMHG | DIASTOLIC BLOOD PRESSURE: 69 MMHG

## 2018-02-17 VITALS — DIASTOLIC BLOOD PRESSURE: 67 MMHG | SYSTOLIC BLOOD PRESSURE: 102 MMHG

## 2018-02-17 LAB
ANION GAP SERPL CALC-SCNC: 5 MMOL/L (ref 5–15)
BASOPHILS # BLD AUTO: 0.04 X10^3/UL (ref 0–0.1)
BASOPHILS NFR BLD AUTO: 1 % (ref 0–1)
CALCIUM SERPL-MCNC: 8 MG/DL (ref 8.5–10.1)
CHLORIDE SERPL-SCNC: 104 MMOL/L (ref 98–107)
CREAT SERPL-MCNC: 0.49 MG/DL (ref 0.7–1.3)
EOSINOPHIL # BLD AUTO: 0.51 X10^3/UL (ref 0–0.4)
EOSINOPHIL NFR BLD AUTO: 6 % (ref 1–7)
ERYTHROCYTE [DISTWIDTH] IN BLOOD BY AUTOMATED COUNT: 13.6 % (ref 9.4–14.8)
LYMPHOCYTES # BLD AUTO: 0.93 X10^3/UL (ref 1–3.4)
LYMPHOCYTES NFR BLD AUTO: 10 % (ref 22–44)
MCH RBC QN AUTO: 30.1 PG (ref 27.5–34.5)
MCHC RBC AUTO-ENTMCNC: 34 G/DL (ref 33.2–36.2)
MCV RBC AUTO: 88.5 FL (ref 81–97)
MD: NO
MONOCYTES # BLD AUTO: 0.69 X10^3/UL (ref 0.2–0.8)
MONOCYTES NFR BLD AUTO: 8 % (ref 2–9)
NEUTROPHILS # BLD AUTO: 6.77 X10^3/UL (ref 1.8–6.8)
NEUTROPHILS NFR BLD AUTO: 76 % (ref 42–75)
PLATELET # BLD AUTO: 641 X10^3/UL (ref 130–400)
PMV BLD AUTO: 6.9 FL (ref 7.4–10.4)
RBC # BLD AUTO: 3.6 X10^6/UL (ref 4.38–5.82)

## 2018-02-17 RX ADMIN — ENOXAPARIN SODIUM SCH MG: 40 INJECTION SUBCUTANEOUS at 22:35

## 2018-02-17 RX ADMIN — MICAFUNGIN SODIUM SCH MLS/HR: 20 INJECTION, POWDER, LYOPHILIZED, FOR SOLUTION INTRAVENOUS at 15:44

## 2018-02-17 RX ADMIN — TAZOBACTAM SODIUM AND PIPERACILLIN SODIUM SCH MLS/HR: 500; 4 INJECTION, SOLUTION INTRAVENOUS at 05:12

## 2018-02-17 RX ADMIN — INSULIN HUMAN SCH UNITS: 100 INJECTION, SOLUTION PARENTERAL at 08:00

## 2018-02-17 RX ADMIN — TAZOBACTAM SODIUM AND PIPERACILLIN SODIUM SCH MLS/HR: 500; 4 INJECTION, SOLUTION INTRAVENOUS at 14:08

## 2018-02-17 RX ADMIN — TAMSULOSIN HYDROCHLORIDE SCH MG: 0.4 CAPSULE ORAL at 08:23

## 2018-02-17 RX ADMIN — TAZOBACTAM SODIUM AND PIPERACILLIN SODIUM SCH MLS/HR: 500; 4 INJECTION, SOLUTION INTRAVENOUS at 22:35

## 2018-02-18 VITALS — SYSTOLIC BLOOD PRESSURE: 101 MMHG | DIASTOLIC BLOOD PRESSURE: 63 MMHG

## 2018-02-18 VITALS — DIASTOLIC BLOOD PRESSURE: 75 MMHG | SYSTOLIC BLOOD PRESSURE: 107 MMHG

## 2018-02-18 VITALS — SYSTOLIC BLOOD PRESSURE: 119 MMHG | DIASTOLIC BLOOD PRESSURE: 79 MMHG

## 2018-02-18 VITALS — SYSTOLIC BLOOD PRESSURE: 118 MMHG | DIASTOLIC BLOOD PRESSURE: 77 MMHG

## 2018-02-18 LAB
ANION GAP SERPL CALC-SCNC: 8 MMOL/L (ref 5–15)
BASOPHILS # BLD AUTO: 0.08 X10^3/UL (ref 0–0.1)
BASOPHILS NFR BLD AUTO: 1 % (ref 0–1)
CALCIUM SERPL-MCNC: 8.1 MG/DL (ref 8.5–10.1)
CHLORIDE SERPL-SCNC: 102 MMOL/L (ref 98–107)
CREAT SERPL-MCNC: 0.52 MG/DL (ref 0.7–1.3)
EOSINOPHIL # BLD AUTO: 0.39 X10^3/UL (ref 0–0.4)
EOSINOPHIL NFR BLD AUTO: 4 % (ref 1–7)
ERYTHROCYTE [DISTWIDTH] IN BLOOD BY AUTOMATED COUNT: 13.4 % (ref 9.4–14.8)
LYMPHOCYTES # BLD AUTO: 0.89 X10^3/UL (ref 1–3.4)
LYMPHOCYTES NFR BLD AUTO: 9 % (ref 22–44)
MCH RBC QN AUTO: 29.6 PG (ref 27.5–34.5)
MCHC RBC AUTO-ENTMCNC: 33.3 G/DL (ref 33.2–36.2)
MCV RBC AUTO: 88.9 FL (ref 81–97)
MD: NO
MONOCYTES # BLD AUTO: 0.7 X10^3/UL (ref 0.2–0.8)
MONOCYTES NFR BLD AUTO: 7 % (ref 2–9)
NEUTROPHILS # BLD AUTO: 7.54 X10^3/UL (ref 1.8–6.8)
NEUTROPHILS NFR BLD AUTO: 79 % (ref 42–75)
PLATELET # BLD AUTO: 680 X10^3/UL (ref 130–400)
PMV BLD AUTO: 6.7 FL (ref 7.4–10.4)
RBC # BLD AUTO: 3.77 X10^6/UL (ref 4.38–5.82)

## 2018-02-18 RX ADMIN — OXYCODONE HYDROCHLORIDE AND ACETAMINOPHEN PRN TAB: 10; 325 TABLET ORAL at 16:40

## 2018-02-18 RX ADMIN — ENOXAPARIN SODIUM SCH MG: 40 INJECTION SUBCUTANEOUS at 21:01

## 2018-02-18 RX ADMIN — TAMSULOSIN HYDROCHLORIDE SCH MG: 0.4 CAPSULE ORAL at 09:55

## 2018-02-18 RX ADMIN — TAZOBACTAM SODIUM AND PIPERACILLIN SODIUM SCH MLS/HR: 500; 4 INJECTION, SOLUTION INTRAVENOUS at 21:01

## 2018-02-18 RX ADMIN — TAZOBACTAM SODIUM AND PIPERACILLIN SODIUM SCH MLS/HR: 500; 4 INJECTION, SOLUTION INTRAVENOUS at 06:35

## 2018-02-18 RX ADMIN — MICAFUNGIN SODIUM SCH MLS/HR: 20 INJECTION, POWDER, LYOPHILIZED, FOR SOLUTION INTRAVENOUS at 17:41

## 2018-02-18 RX ADMIN — OXYCODONE HYDROCHLORIDE AND ACETAMINOPHEN PRN TAB: 10; 325 TABLET ORAL at 21:02

## 2018-02-18 RX ADMIN — TAZOBACTAM SODIUM AND PIPERACILLIN SODIUM SCH MLS/HR: 500; 4 INJECTION, SOLUTION INTRAVENOUS at 16:39

## 2018-02-18 RX ADMIN — OXYCODONE HYDROCHLORIDE AND ACETAMINOPHEN PRN TAB: 10; 325 TABLET ORAL at 12:45

## 2018-02-18 RX ADMIN — INSULIN HUMAN SCH UNITS: 100 INJECTION, SOLUTION PARENTERAL at 07:46

## 2018-02-19 VITALS — SYSTOLIC BLOOD PRESSURE: 132 MMHG | DIASTOLIC BLOOD PRESSURE: 76 MMHG

## 2018-02-19 VITALS — SYSTOLIC BLOOD PRESSURE: 112 MMHG | DIASTOLIC BLOOD PRESSURE: 74 MMHG

## 2018-02-19 VITALS — SYSTOLIC BLOOD PRESSURE: 117 MMHG | DIASTOLIC BLOOD PRESSURE: 71 MMHG

## 2018-02-19 VITALS — DIASTOLIC BLOOD PRESSURE: 67 MMHG | SYSTOLIC BLOOD PRESSURE: 104 MMHG

## 2018-02-19 LAB
ALBUMIN SERPL-MCNC: 2.2 G/DL (ref 3.4–5)
ALP SERPL-CCNC: 126 U/L (ref 45–117)
ALT SERPL-CCNC: 31 U/L (ref 12–78)
ANION GAP SERPL CALC-SCNC: 7 MMOL/L (ref 5–15)
BASOPHILS # BLD AUTO: 0.05 X10^3/UL (ref 0–0.1)
BASOPHILS NFR BLD AUTO: 1 % (ref 0–1)
BILIRUB SERPL-MCNC: 0.4 MG/DL (ref 0.2–1)
CALCIUM SERPL-MCNC: 8.2 MG/DL (ref 8.5–10.1)
CHLORIDE SERPL-SCNC: 107 MMOL/L (ref 98–107)
CREAT SERPL-MCNC: 0.58 MG/DL (ref 0.7–1.3)
CRP SERPL-MCNC: 2.7 MG/DL (ref 0.02–0.49)
EOSINOPHIL # BLD AUTO: 0.33 X10^3/UL (ref 0–0.4)
EOSINOPHIL NFR BLD AUTO: 4 % (ref 1–7)
ERYTHROCYTE [DISTWIDTH] IN BLOOD BY AUTOMATED COUNT: 13.9 % (ref 9.4–14.8)
ERYTHROCYTE [SEDIMENTATION RATE] IN BLOOD BY WESTERGREN METHOD: 102 MM/HR (ref 0–10)
HCT (SEDRATE): 34.3 % (ref 39.2–51.8)
LYMPHOCYTES # BLD AUTO: 0.8 X10^3/UL (ref 1–3.4)
LYMPHOCYTES NFR BLD AUTO: 9 % (ref 22–44)
MCH RBC QN AUTO: 29.8 PG (ref 27.5–34.5)
MCHC RBC AUTO-ENTMCNC: 33.7 G/DL (ref 33.2–36.2)
MCV RBC AUTO: 88.6 FL (ref 81–97)
MD: NO
MONOCYTES # BLD AUTO: 0.6 X10^3/UL (ref 0.2–0.8)
MONOCYTES NFR BLD AUTO: 7 % (ref 2–9)
NEUTROPHILS # BLD AUTO: 7.3 X10^3/UL (ref 1.8–6.8)
NEUTROPHILS NFR BLD AUTO: 81 % (ref 42–75)
PLATELET # BLD AUTO: 679 X10^3/UL (ref 130–400)
PMV BLD AUTO: 7 FL (ref 7.4–10.4)
PREALB SERPL-MCNC: 16 MG/DL (ref 20–40)
PROT SERPL-MCNC: 7.1 G/DL (ref 6.4–8.2)
RBC # BLD AUTO: 3.87 X10^6/UL (ref 4.38–5.82)
TRIGL SERPL-MCNC: 146 MG/DL (ref 50–200)

## 2018-02-19 RX ADMIN — TAZOBACTAM SODIUM AND PIPERACILLIN SODIUM SCH MLS/HR: 500; 4 INJECTION, SOLUTION INTRAVENOUS at 14:05

## 2018-02-19 RX ADMIN — OXYCODONE HYDROCHLORIDE AND ACETAMINOPHEN PRN TAB: 10; 325 TABLET ORAL at 17:02

## 2018-02-19 RX ADMIN — SODIUM CHLORIDE SCH MLS/HR: 0.45 INJECTION, SOLUTION INTRAVENOUS at 17:02

## 2018-02-19 RX ADMIN — OXYCODONE HYDROCHLORIDE AND ACETAMINOPHEN PRN TAB: 10; 325 TABLET ORAL at 21:28

## 2018-02-19 RX ADMIN — MICAFUNGIN SODIUM SCH MLS/HR: 20 INJECTION, POWDER, LYOPHILIZED, FOR SOLUTION INTRAVENOUS at 20:35

## 2018-02-19 RX ADMIN — TAMSULOSIN HYDROCHLORIDE SCH MG: 0.4 CAPSULE ORAL at 09:29

## 2018-02-19 RX ADMIN — INSULIN HUMAN SCH UNITS: 100 INJECTION, SOLUTION PARENTERAL at 06:24

## 2018-02-19 RX ADMIN — TAZOBACTAM SODIUM AND PIPERACILLIN SODIUM SCH MLS/HR: 500; 4 INJECTION, SOLUTION INTRAVENOUS at 21:29

## 2018-02-19 RX ADMIN — OXYCODONE HYDROCHLORIDE AND ACETAMINOPHEN PRN TAB: 10; 325 TABLET ORAL at 09:30

## 2018-02-19 RX ADMIN — TAZOBACTAM SODIUM AND PIPERACILLIN SODIUM SCH MLS/HR: 500; 4 INJECTION, SOLUTION INTRAVENOUS at 05:35

## 2018-02-19 RX ADMIN — OXYCODONE HYDROCHLORIDE AND ACETAMINOPHEN PRN TAB: 10; 325 TABLET ORAL at 05:35

## 2018-02-19 RX ADMIN — OXYCODONE HYDROCHLORIDE AND ACETAMINOPHEN PRN TAB: 10; 325 TABLET ORAL at 12:28

## 2018-02-19 RX ADMIN — ENOXAPARIN SODIUM SCH MG: 40 INJECTION SUBCUTANEOUS at 20:35

## 2018-02-20 ENCOUNTER — HOSPITAL ENCOUNTER (OUTPATIENT)
Facility: MEDICAL CENTER | Age: 58
End: 2018-03-13
Attending: INTERNAL MEDICINE | Admitting: INTERNAL MEDICINE
Payer: COMMERCIAL

## 2018-02-20 VITALS — SYSTOLIC BLOOD PRESSURE: 118 MMHG | DIASTOLIC BLOOD PRESSURE: 75 MMHG

## 2018-02-20 VITALS — SYSTOLIC BLOOD PRESSURE: 138 MMHG | DIASTOLIC BLOOD PRESSURE: 81 MMHG

## 2018-02-20 VITALS — SYSTOLIC BLOOD PRESSURE: 102 MMHG | DIASTOLIC BLOOD PRESSURE: 70 MMHG

## 2018-02-20 LAB
ANION GAP SERPL CALC-SCNC: 8 MMOL/L (ref 5–15)
BASOPHILS # BLD AUTO: 0.04 X10^3/UL (ref 0–0.1)
BASOPHILS NFR BLD AUTO: 1 % (ref 0–1)
CALCIUM SERPL-MCNC: 8 MG/DL (ref 8.5–10.1)
CHLORIDE SERPL-SCNC: 106 MMOL/L (ref 98–107)
CREAT SERPL-MCNC: 0.47 MG/DL (ref 0.7–1.3)
EOSINOPHIL # BLD AUTO: 0.45 X10^3/UL (ref 0–0.4)
EOSINOPHIL NFR BLD AUTO: 6 % (ref 1–7)
ERYTHROCYTE [DISTWIDTH] IN BLOOD BY AUTOMATED COUNT: 13.9 % (ref 9.4–14.8)
LYMPHOCYTES # BLD AUTO: 1.01 X10^3/UL (ref 1–3.4)
LYMPHOCYTES NFR BLD AUTO: 13 % (ref 22–44)
MCH RBC QN AUTO: 29.7 PG (ref 27.5–34.5)
MCHC RBC AUTO-ENTMCNC: 33.7 G/DL (ref 33.2–36.2)
MCV RBC AUTO: 88.3 FL (ref 81–97)
MD: NO
MONOCYTES # BLD AUTO: 0.59 X10^3/UL (ref 0.2–0.8)
MONOCYTES NFR BLD AUTO: 8 % (ref 2–9)
NEUTROPHILS # BLD AUTO: 5.75 X10^3/UL (ref 1.8–6.8)
NEUTROPHILS NFR BLD AUTO: 73 % (ref 42–75)
PLATELET # BLD AUTO: 613 X10^3/UL (ref 130–400)
PMV BLD AUTO: 7.1 FL (ref 7.4–10.4)
RBC # BLD AUTO: 3.59 X10^6/UL (ref 4.38–5.82)

## 2018-02-20 PROCEDURE — 87641 MR-STAPH DNA AMP PROBE: CPT

## 2018-02-20 RX ADMIN — TAMSULOSIN HYDROCHLORIDE SCH MG: 0.4 CAPSULE ORAL at 09:23

## 2018-02-20 RX ADMIN — INSULIN HUMAN SCH UNITS: 100 INJECTION, SOLUTION PARENTERAL at 06:42

## 2018-02-20 RX ADMIN — TAZOBACTAM SODIUM AND PIPERACILLIN SODIUM SCH MLS/HR: 500; 4 INJECTION, SOLUTION INTRAVENOUS at 05:22

## 2018-02-20 RX ADMIN — OXYCODONE HYDROCHLORIDE AND ACETAMINOPHEN PRN TAB: 10; 325 TABLET ORAL at 09:24

## 2018-02-20 RX ADMIN — TAZOBACTAM SODIUM AND PIPERACILLIN SODIUM SCH MLS/HR: 500; 4 INJECTION, SOLUTION INTRAVENOUS at 13:52

## 2018-02-20 RX ADMIN — OXYCODONE HYDROCHLORIDE AND ACETAMINOPHEN PRN TAB: 10; 325 TABLET ORAL at 05:22

## 2018-02-21 LAB
ALBUMIN SERPL BCP-MCNC: 2.9 G/DL (ref 3.2–4.9)
ALBUMIN/GLOB SERPL: 0.8 G/DL
ALP SERPL-CCNC: 107 U/L (ref 30–99)
ALT SERPL-CCNC: 23 U/L (ref 2–50)
ANION GAP SERPL CALC-SCNC: 6 MMOL/L (ref 0–11.9)
APTT PPP: 36.7 SEC (ref 24.7–36)
AST SERPL-CCNC: 17 U/L (ref 12–45)
BASOPHILS # BLD AUTO: 0.6 % (ref 0–1.8)
BASOPHILS # BLD: 0.05 K/UL (ref 0–0.12)
BILIRUB SERPL-MCNC: 0.2 MG/DL (ref 0.1–1.5)
BUN SERPL-MCNC: 13 MG/DL (ref 8–22)
CALCIUM SERPL-MCNC: 8.5 MG/DL (ref 8.4–10.2)
CHLORIDE SERPL-SCNC: 101 MMOL/L (ref 96–112)
CO2 SERPL-SCNC: 27 MMOL/L (ref 20–33)
CREAT SERPL-MCNC: 0.58 MG/DL (ref 0.5–1.4)
CRP SERPL HS-MCNC: 1.28 MG/DL (ref 0–0.75)
EOSINOPHIL # BLD AUTO: 0.44 K/UL (ref 0–0.51)
EOSINOPHIL NFR BLD: 5.1 % (ref 0–6.9)
ERYTHROCYTE [DISTWIDTH] IN BLOOD BY AUTOMATED COUNT: 41.2 FL (ref 35.9–50)
ERYTHROCYTE [SEDIMENTATION RATE] IN BLOOD BY WESTERGREN METHOD: 80 MM/HOUR (ref 0–20)
GLOBULIN SER CALC-MCNC: 3.8 G/DL (ref 1.9–3.5)
GLUCOSE SERPL-MCNC: 98 MG/DL (ref 65–99)
HCT VFR BLD AUTO: 32.1 % (ref 42–52)
HGB BLD-MCNC: 11 G/DL (ref 14–18)
IMM GRANULOCYTES # BLD AUTO: 0.09 K/UL (ref 0–0.11)
IMM GRANULOCYTES NFR BLD AUTO: 1 % (ref 0–0.9)
INR PPP: 1.09 (ref 0.87–1.13)
LYMPHOCYTES # BLD AUTO: 1.31 K/UL (ref 1–4.8)
LYMPHOCYTES NFR BLD: 15.2 % (ref 22–41)
MAGNESIUM SERPL-MCNC: 1.8 MG/DL (ref 1.5–2.5)
MCH RBC QN AUTO: 29.6 PG (ref 27–33)
MCHC RBC AUTO-ENTMCNC: 34.3 G/DL (ref 33.7–35.3)
MCV RBC AUTO: 86.3 FL (ref 81.4–97.8)
MONOCYTES # BLD AUTO: 0.53 K/UL (ref 0–0.85)
MONOCYTES NFR BLD AUTO: 6.2 % (ref 0–13.4)
MRSA DNA SPEC QL NAA+PROBE: NORMAL
NEUTROPHILS # BLD AUTO: 6.19 K/UL (ref 1.82–7.42)
NEUTROPHILS NFR BLD: 71.9 % (ref 44–72)
NRBC # BLD AUTO: 0 K/UL
NRBC BLD-RTO: 0 /100 WBC
PHOSPHATE SERPL-MCNC: 4.6 MG/DL (ref 2.5–4.5)
PLATELET # BLD AUTO: 512 K/UL (ref 164–446)
PMV BLD AUTO: 8.9 FL (ref 9–12.9)
POTASSIUM SERPL-SCNC: 3.7 MMOL/L (ref 3.6–5.5)
PREALB SERPL-MCNC: 17 MG/DL (ref 18–38)
PROT SERPL-MCNC: 6.7 G/DL (ref 6–8.2)
PROTHROMBIN TIME: 13.7 SEC (ref 12–14.6)
RBC # BLD AUTO: 3.72 M/UL (ref 4.7–6.1)
SIGNIFICANT IND 70042: NORMAL
SITE SITE: NORMAL
SODIUM SERPL-SCNC: 134 MMOL/L (ref 135–145)
SOURCE SOURCE: NORMAL
WBC # BLD AUTO: 8.6 K/UL (ref 4.8–10.8)

## 2018-02-21 PROCEDURE — 80053 COMPREHEN METABOLIC PANEL: CPT

## 2018-02-21 PROCEDURE — 84100 ASSAY OF PHOSPHORUS: CPT

## 2018-02-21 PROCEDURE — 86140 C-REACTIVE PROTEIN: CPT

## 2018-02-21 PROCEDURE — 85652 RBC SED RATE AUTOMATED: CPT

## 2018-02-21 PROCEDURE — 85610 PROTHROMBIN TIME: CPT

## 2018-02-21 PROCEDURE — 84134 ASSAY OF PREALBUMIN: CPT

## 2018-02-21 PROCEDURE — 85025 COMPLETE CBC W/AUTO DIFF WBC: CPT

## 2018-02-21 PROCEDURE — 85730 THROMBOPLASTIN TIME PARTIAL: CPT

## 2018-02-21 PROCEDURE — 83735 ASSAY OF MAGNESIUM: CPT

## 2018-02-21 ASSESSMENT — ENCOUNTER SYMPTOMS
NAUSEA: 0
MYALGIAS: 0
COUGH: 0
WEAKNESS: 0
SPUTUM PRODUCTION: 0
CHILLS: 0
HEADACHES: 0
FEVER: 0
ABDOMINAL PAIN: 0
VOMITING: 0

## 2018-02-21 NOTE — PROGRESS NOTES
Infectious Disease Progress Note    Author: Gabriel Mosquedamathew Date & Time created: 2/21/2018  1:15 PM    Interval History:  Zosyn/Micafungin day #6 (started 2/15) Abx day #11    2/1: Lower Anterior resection of the sigmoid colon with colostomy secondary to a stricture caused bu diverticular chronic inflammation.  2/9: Abd/CT: Inflammation of the colon at and just proximal to the colostomy suggests an infection or ischemia. Suspected 5.5x7.6 cm abscess medial to the distal colon and colostomy site. Partial intestinal obstruction.   2/10: Ex Lap. Colon resection. Ostomy revision. Abscess drainage.  2.17: CT Ab/Pelv:  Interval resolution of obstruction. Substantial decrease in size of fluid collection in the central abdomen since the prior exam. Residual thin collection in the ventral right abdomen measuring 5.3 x 1.0 cm. New small collections in the left abdomen adjacent to the drain and in the pelvis.  2/17: Blood Culture: NGTD  2/18: IR placed drain.  2/19: Wound Vac Change.   2/20: Transfer to [facility].    No acute issues. No fevers. No problems on transfer.  Dr Neumann following. Pain controlled.    Labs Reviewed, Medications Reviewed, Radiology Reviewed, Wound Reviewed, Fluids Reviewed and GI Nutrition Reviewed    Review of Systems:  Review of Systems   Constitutional: Negative for chills, fever and malaise/fatigue.   Respiratory: Negative for cough and sputum production.    Cardiovascular: Negative for chest pain.   Gastrointestinal: Negative for abdominal pain, nausea and vomiting.   Musculoskeletal: Negative for myalgias.   Skin: Negative for rash.   Neurological: Negative for weakness and headaches.       Physical Exam:  Physical Exam   Constitutional: He is oriented to person, place, and time. He appears well-developed and well-nourished.   Cardiovascular: Normal rate and regular rhythm.    Pulmonary/Chest: Effort normal and breath sounds normal.   Abdominal: Soft. Bowel sounds are normal.   Ostomy. Drains.    Musculoskeletal: He exhibits no edema.   Neurological: He is alert and oriented to person, place, and time.   Skin: Skin is warm and dry.   Psychiatric: He has a normal mood and affect.       Labs:  Recent Results (from the past 24 hour(s))   MRSA BY PCR (AMP)    Collection Time: 02/20/18  5:49 PM   Result Value Ref Range    Significant Indicator NEG     Source RESP     Site NARES     MRSA PCR Negative for MRSA by PCR.    CBC WITH DIFFERENTIAL    Collection Time: 02/21/18  3:30 AM   Result Value Ref Range    WBC 8.6 4.8 - 10.8 K/uL    RBC 3.72 (L) 4.70 - 6.10 M/uL    Hemoglobin 11.0 (L) 14.0 - 18.0 g/dL    Hematocrit 32.1 (L) 42.0 - 52.0 %    MCV 86.3 81.4 - 97.8 fL    MCH 29.6 27.0 - 33.0 pg    MCHC 34.3 33.7 - 35.3 g/dL    RDW 41.2 35.9 - 50.0 fL    Platelet Count 512 (H) 164 - 446 K/uL    MPV 8.9 (L) 9.0 - 12.9 fL    Neutrophils-Polys 71.90 44.00 - 72.00 %    Lymphocytes 15.20 (L) 22.00 - 41.00 %    Monocytes 6.20 0.00 - 13.40 %    Eosinophils 5.10 0.00 - 6.90 %    Basophils 0.60 0.00 - 1.80 %    Immature Granulocytes 1.00 (H) 0.00 - 0.90 %    Nucleated RBC 0.00 /100 WBC    Neutrophils (Absolute) 6.19 1.82 - 7.42 K/uL    Lymphs (Absolute) 1.31 1.00 - 4.80 K/uL    Monos (Absolute) 0.53 0.00 - 0.85 K/uL    Eos (Absolute) 0.44 0.00 - 0.51 K/uL    Baso (Absolute) 0.05 0.00 - 0.12 K/uL    Immature Granulocytes (abs) 0.09 0.00 - 0.11 K/uL    NRBC (Absolute) 0.00 K/uL   COMP METABOLIC PANEL    Collection Time: 02/21/18  3:30 AM   Result Value Ref Range    Sodium 134 (L) 135 - 145 mmol/L    Potassium 3.7 3.6 - 5.5 mmol/L    Chloride 101 96 - 112 mmol/L    Co2 27 20 - 33 mmol/L    Anion Gap 6.0 0.0 - 11.9    Glucose 98 65 - 99 mg/dL    Bun 13 8 - 22 mg/dL    Creatinine 0.58 0.50 - 1.40 mg/dL    Calcium 8.5 8.4 - 10.2 mg/dL    AST(SGOT) 17 12 - 45 U/L    ALT(SGPT) 23 2 - 50 U/L    Alkaline Phosphatase 107 (H) 30 - 99 U/L    Total Bilirubin 0.2 0.1 - 1.5 mg/dL    Albumin 2.9 (L) 3.2 - 4.9 g/dL    Total Protein 6.7 6.0 -  8.2 g/dL    Globulin 3.8 (H) 1.9 - 3.5 g/dL    A-G Ratio 0.8 g/dL   PROTHROMBIN TIME    Collection Time: 02/21/18  3:30 AM   Result Value Ref Range    PT 13.7 12.0 - 14.6 sec    INR 1.09 0.87 - 1.13   APTT    Collection Time: 02/21/18  3:30 AM   Result Value Ref Range    APTT 36.7 (H) 24.7 - 36.0 sec   MAGNESIUM    Collection Time: 02/21/18  3:30 AM   Result Value Ref Range    Magnesium 1.8 1.5 - 2.5 mg/dL   PHOSPHORUS    Collection Time: 02/21/18  3:30 AM   Result Value Ref Range    Phosphorus 4.6 (H) 2.5 - 4.5 mg/dL   PREALBUMIN    Collection Time: 02/21/18  3:30 AM   Result Value Ref Range    Pre-Albumin 17.0 (L) 18.0 - 38.0 mg/dL   CRP QUANTITIVE (NON-CARDIAC)    Collection Time: 02/21/18  3:30 AM   Result Value Ref Range    Stat C-Reactive Protein 1.28 (H) 0.00 - 0.75 mg/dL   WESTERGREN SED RATE    Collection Time: 02/21/18  3:30 AM   Result Value Ref Range    Sed Rate Westergren 80 (H) 0 - 20 mm/hour   ESTIMATED GFR    Collection Time: 02/21/18  3:30 AM   Result Value Ref Range    GFR If African American >60 >60 mL/min/1.73 m 2    GFR If Non African American >60 >60 mL/min/1.73 m 2     Results     Procedure Component Value Units Date/Time    MRSA BY PCR (AMP) [[id number]] Collected:  02/20/18 1359    Order Status:  Completed Specimen:  Respirate from Nares Updated:  02/21/18 1012     Significant Indicator NEG     Source RESP     Site NARES     MRSA PCR Negative for MRSA by PCR.    Narrative:       Collected By:[id number] DARIUSZ WOODALL        GI/Nutrition:  Orders Placed This Encounter   Procedures   • DIET ORDER     Standing Status:   Standing     Number of Occurrences:   1     Order Specific Question:   Diet:     Answer:   Regular [1]     Order Specific Question:   Consistency/Fluid modifications:     Answer:   Thin Liquids [3]     Medical Decision Making, by Problem:  1. Sepsis 2/2 an acute intra abdominal abscess with peritonitis.             - Sepsis portion resolved.            - Organisms:  Enterococcus, Kleb, PSeud, MSSA, Bacteroides             - Associated with Bacteroides Bacteremia       2. S/p Lower anterior resection of the sigmoid colon with colostomy             - 2/2 a stricture caused bu diverticular chronic inflammation.  3. Status post revision colostomy for ischemic colostomy areas with necrosis.  4. Anemia of chronic disease  5. Thombocytosis    Plan:  Continue zosyn and micafungin. Duration will be determined by interval CT scans - last 2/17.  Continue wound care  Increase activity.  Close observation of clinical course.  No changes today    >30 min of care time. Over 50 % of that time in direct care and counseling.     Thank you for this consult. We will continue to follow along with you.    Dr Martini

## 2018-02-22 ASSESSMENT — ENCOUNTER SYMPTOMS
NAUSEA: 0
HEADACHES: 0
WEAKNESS: 0
ABDOMINAL PAIN: 0
SPUTUM PRODUCTION: 0
FEVER: 0
MYALGIAS: 0
COUGH: 0
VOMITING: 0
CHILLS: 0

## 2018-02-22 NOTE — PROGRESS NOTES
Infectious Disease Progress Note    Author: Ariana Tripp M.D. Date & Time created: 2/22/2018  9:06 AM    Interval History:  Zosyn/anidulafungin Day #7 ( prev micafungin at [facility]/ anidulafungin substituted at [facility]) (started 2/15)   Abx day #12    2/1: Lower Anterior resection of the sigmoid colon with colostomy secondary to a stricture caused bu diverticular chronic inflammation.  2/9: Abd/CT: Inflammation of the colon at and just proximal to the colostomy suggests an infection or ischemia. Suspected 5.5x7.6 cm abscess medial to the distal colon and colostomy site. Partial intestinal obstruction.   2/10: Ex Lap. Colon resection. Ostomy revision. Abscess drainage.  2.17: CT Ab/Pelv:  Interval resolution of obstruction. Substantial decrease in size of fluid collection in the central abdomen since the prior exam. Residual thin collection in the ventral right abdomen measuring 5.3 x 1.0 cm. New small collections in the left abdomen adjacent to the drain and in the pelvis.  2/17: Blood Culture: NGTD  2/18: IR placed drain.  2/19: Wound Vac Change.   2/20: Transfer to [facility].  2/21: RN notes purulent bloody drainage in CHUCK.  Only 5 cc documented in past 24 hours.  No acute issues. No fevers. No problems on transfer.  Dr Neumann following. Pain controlled.    Labs Reviewed, Medications Reviewed, Radiology Reviewed, Wound Reviewed, Fluids Reviewed and GI Nutrition Reviewed    Review of Systems:  Review of Systems   Constitutional: Negative for chills, fever and malaise/fatigue.   Respiratory: Negative for cough and sputum production.    Cardiovascular: Negative for chest pain.   Gastrointestinal: Negative for abdominal pain, nausea and vomiting.   Musculoskeletal: Negative for myalgias.   Skin: Negative for rash.   Neurological: Negative for weakness and headaches.       Physical Exam:  Physical Exam   Constitutional: He is oriented to person, place, and time. He appears well-developed and well-nourished.   Cardiovascular:  Normal rate and regular rhythm.    Pulmonary/Chest: Effort normal and breath sounds normal.   Abdominal: Soft. Bowel sounds are normal.   Ostomy. Drains. Scant material in CHUCK bulb.   Musculoskeletal: He exhibits no edema.   Neurological: He is alert and oriented to person, place, and time.   Skin: Skin is warm and dry.   Psychiatric: He has a normal mood and affect.       Labs:  No results found for this or any previous visit (from the past 24 hour(s)).  Results     Procedure Component Value Units Date/Time    MRSA BY PCR (AMP) [[id number]] Collected:  02/20/18 4306    Order Status:  Completed Specimen:  Respirate from Nares Updated:  02/21/18 1012     Significant Indicator NEG     Source RESP     Site NARES     MRSA PCR Negative for MRSA by PCR.    Narrative:       Collected By:[id number] DARIUSZ WOODALL        GI/Nutrition:  Orders Placed This Encounter   Procedures   • DIET ORDER     Standing Status:   Standing     Number of Occurrences:   1     Order Specific Question:   Diet:     Answer:   Regular [1]     Order Specific Question:   Consistency/Fluid modifications:     Answer:   Thin Liquids [3]     Medical Decision Making, by Problem:  1. Sepsis 2/2 an acute intra abdominal abscess with peritonitis.             - Sepsis portion resolved.            - Organisms: Enterococcus, Kleb, PSeud, MSSA, Bacteroides             - Associated with Bacteroides Bacteremia       2. S/p Lower anterior resection of the sigmoid colon with colostomy             - 2/2 a stricture caused bu diverticular chronic inflammation.  3. Status post revision colostomy for ischemic colostomy areas with necrosis.  4. Anemia of chronic disease  5. Thombocytosis    Plan:  Continue zosyn and anidulafungin. Duration will be determined by interval CT scans - last 2/17.  Continue wound care  Increase activity.  Close observation of clinical course.  No changes today    >30 min of care time. Over 50 % of that time in direct care and counseling.

## 2018-02-23 ASSESSMENT — ENCOUNTER SYMPTOMS
COUGH: 0
HEADACHES: 0
ABDOMINAL PAIN: 0
MYALGIAS: 0
VOMITING: 0
FEVER: 0
WEAKNESS: 0
SPUTUM PRODUCTION: 0
CHILLS: 0
NAUSEA: 0

## 2018-02-23 NOTE — PROGRESS NOTES
Infectious Disease Progress Note    Author: Gabriel Martini M.D. Date & Time created: 2/23/2018  7:19 AM    Interval History:  Zosyn/anidulafungin Day #8 ( prev micafungin at [facility]/ anidulafungin substituted at [facility]) (started 2/15)   Abx day #13    2/1: Lower Anterior resection of the sigmoid colon with colostomy secondary to a stricture caused bu diverticular chronic inflammation.  2/9: Abd/CT: Inflammation of the colon at and just proximal to the colostomy suggests an infection or ischemia. Suspected 5.5x7.6 cm abscess medial to the distal colon and colostomy site. Partial intestinal obstruction.   2/10: Ex Lap. Colon resection. Ostomy revision. Abscess drainage.  2.17: CT Ab/Pelv:  Interval resolution of obstruction. Substantial decrease in size of fluid collection in the central abdomen since the prior exam. Residual thin collection in the ventral right abdomen measuring 5.3 x 1.0 cm. New small collections in the left abdomen adjacent to the drain and in the pelvis.  2/17: Blood Culture: NGTD  2/18: IR placed drain.  2/19: Wound Vac Change.   2/20: Transfer to [facility].  2/21: RN notes purulent bloody drainage in CHUCK.  Only 5 cc documented in past 24 hours.  2/22: Little no no drainage out of CHUCK.    No acute issues. No fevers. Pain controlled.    Labs Reviewed, Medications Reviewed, Radiology Reviewed, Wound Reviewed, Fluids Reviewed and GI Nutrition Reviewed    Review of Systems:  Review of Systems   Constitutional: Negative for chills, fever and malaise/fatigue.   Respiratory: Negative for cough and sputum production.    Cardiovascular: Negative for chest pain.   Gastrointestinal: Negative for abdominal pain, nausea and vomiting.   Musculoskeletal: Negative for myalgias.   Skin: Negative for rash.   Neurological: Negative for weakness and headaches.       Physical Exam:  T98.2 P84 RR18 /63 Sat 96% on RA  Physical Exam   Constitutional: He is oriented to person, place, and time. He appears well-developed  and well-nourished.   Cardiovascular: Normal rate and regular rhythm.    Pulmonary/Chest: Effort normal and breath sounds normal.   Abdominal: Soft. Bowel sounds are normal.   Ostomy. Drains. Scant material in CHUKC bulb.   Musculoskeletal: He exhibits no edema.   Neurological: He is alert and oriented to person, place, and time.   Skin: Skin is warm and dry.   Psychiatric: He has a normal mood and affect.       Labs:  No results found for this or any previous visit (from the past 24 hour(s)).  Results     Procedure Component Value Units Date/Time    MRSA BY PCR (AMP) [[id number]] Collected:  02/20/18 1749    Order Status:  Completed Specimen:  Respirate from Nares Updated:  02/21/18 1012     Significant Indicator NEG     Source RESP     Site NARES     MRSA PCR Negative for MRSA by PCR.    Narrative:       Collected By:[id number] DARIUSZ WOODALL        GI/Nutrition:  Orders Placed This Encounter   Procedures   • DIET ORDER     Standing Status:   Standing     Number of Occurrences:   1     Order Specific Question:   Diet:     Answer:   Regular [1]     Order Specific Question:   Consistency/Fluid modifications:     Answer:   Thin Liquids [3]     Medical Decision Making, by Problem:  1. Sepsis 2/2 an acute intra abdominal abscess with peritonitis.             - Sepsis portion resolved.            - Organisms: Enterococcus, Kleb, PSeud, MSSA, Bacteroides             - Associated with Bacteroides Bacteremia       2. S/p Lower anterior resection of the sigmoid colon with colostomy             - 2/2 a stricture caused by diverticular chronic inflammation.  3. Status post revision colostomy for ischemic colostomy areas with necrosis.  4. Anemia of chronic disease  5. Thombocytosis    Plan:  Continue zosyn/anidulafungin. Anticipate ~ 4-6 weeks total  Duration will be determined by interval CT scans - last 2/17.        - Next CT prudent around 3/3.  Continue wound care and monitor drain activity.  Continue activity  improvement.    >20 min of care time. Over 50 % of that time in direct care and counseling.     Thank you for this consult. We will continue to follow along with you.    Dr Martini

## 2018-02-24 ASSESSMENT — ENCOUNTER SYMPTOMS
NAUSEA: 0
SPUTUM PRODUCTION: 0
HEADACHES: 0
VOMITING: 0
FEVER: 0
ABDOMINAL PAIN: 0
COUGH: 0
WEAKNESS: 0
CHILLS: 0
MYALGIAS: 0

## 2018-02-24 NOTE — PROGRESS NOTES
Infectious Disease Progress Note    Author: Ariana Tripp M.D. Date & Time created: 2/24/2018  3:10 PM    Interval History:  Zosyn/anidulafungin Day #9 ( prev micafungin at [facility]/ anidulafungin substituted at [facility]) (started 2/15)   Abx day #14    2/1: Lower Anterior resection of the sigmoid colon with colostomy secondary to a stricture caused bu diverticular chronic inflammation.  2/9: Abd/CT: Inflammation of the colon at and just proximal to the colostomy suggests an infection or ischemia. Suspected 5.5x7.6 cm abscess medial to the distal colon and colostomy site. Partial intestinal obstruction.   2/10: Ex Lap. Colon resection. Ostomy revision. Abscess drainage.  2.17: CT Ab/Pelv:  Interval resolution of obstruction. Substantial decrease in size of fluid collection in the central abdomen since the prior exam. Residual thin collection in the ventral right abdomen measuring 5.3 x 1.0 cm. New small collections in the left abdomen adjacent to the drain and in the pelvis.  2/17: Blood Culture: NGTD  2/18: IR placed drain.  2/19: Wound Vac Change.   2/20: Transfer to [facility].  2/21: RN notes purulent bloody drainage in CHUCK.  Only 5 cc documented in past 24 hours.  2/22: Little no no drainage out of CHUCK.  2/24: CHUCK output 15 cc/24 hours, Accordian 40 cc/24 hours.  Taking oxy IR for pain control.  Still weak.        Labs Reviewed, Medications Reviewed, Radiology Reviewed, Wound Reviewed, Fluids Reviewed and GI Nutrition Reviewed    Review of Systems:  Review of Systems   Constitutional: Negative for chills, fever and malaise/fatigue.   Respiratory: Negative for cough and sputum production.    Cardiovascular: Negative for chest pain.   Gastrointestinal: Negative for abdominal pain, nausea and vomiting.   Musculoskeletal: Negative for myalgias.   Skin: Negative for rash.   Neurological: Negative for weakness and headaches.       Physical Exam:  T 98.5  /54  HR 99 RR 20  Physical Exam   Constitutional: He is oriented to  person, place, and time. He appears well-developed and well-nourished.   Cardiovascular: Normal rate and regular rhythm.    Pulmonary/Chest: Effort normal and breath sounds normal.   Abdominal: Soft. Bowel sounds are normal.   Ostomy. Drains in place: CHUCK and accordian.   Musculoskeletal: He exhibits no edema.   Neurological: He is alert and oriented to person, place, and time.   Skin: Skin is warm and dry.   Psychiatric: He has a normal mood and affect.       Labs:  No results found for this or any previous visit (from the past 24 hour(s)).  Results     Procedure Component Value Units Date/Time    MRSA BY PCR (AMP) [[id number]] Collected:  02/20/18 4829    Order Status:  Completed Specimen:  Respirate from Nares Updated:  02/21/18 1012     Significant Indicator NEG     Source RESP     Site NARES     MRSA PCR Negative for MRSA by PCR.    Narrative:       Collected By:[id number] DARIUSZ WOODALL        GI/Nutrition:  Orders Placed This Encounter   Procedures   • DIET ORDER     Standing Status:   Standing     Number of Occurrences:   1     Order Specific Question:   Diet:     Answer:   Regular [1]     Order Specific Question:   Consistency/Fluid modifications:     Answer:   Thin Liquids [3]     Medical Decision Making, by Problem:  1. Sepsis 2/2 an acute intra abdominal abscess with peritonitis.             - Sepsis portion resolved.            - Organisms: Enterococcus, Kleb, PSeud, MSSA, Bacteroides             - Associated with Bacteroides Bacteremia       2. S/p Lower anterior resection of the sigmoid colon with colostomy             - 2/2 a stricture caused by diverticular chronic inflammation.  3. Status post revision colostomy for ischemic colostomy areas with necrosis.  4. Anemia of chronic disease  5. Thombocytosis    Plan:  Continue zosyn/anidulafungin. Anticipate ~ 4-6 weeks total  Duration will be determined by interval CT scans - last 2/17.        - Next CT prudent around 3/3.  Continue wound care and  monitor drain activity.  Continue activity improvement.    No new recommendations.  Due for another CT the end of next week.  25 min of care time. >50% of time spent in coordination of care/counseling.

## 2018-02-25 ASSESSMENT — ENCOUNTER SYMPTOMS
ABDOMINAL PAIN: 0
WEAKNESS: 0
FEVER: 0
HEADACHES: 0
NAUSEA: 0
VOMITING: 0
CHILLS: 0
SPUTUM PRODUCTION: 0
COUGH: 0
MYALGIAS: 0

## 2018-02-25 NOTE — PROGRESS NOTES
Infectious Disease Progress Note    Author: Ariana Tripp M.D. Date & Time created: 2/25/2018  2:18 PM    Interval History:  Zosyn/fluconazole Day #9 ( prev micafungin at [facility]/ anidulafunginsubstituted at [facility], then changed to fluconazole 2/23) (started 2/15)   Abx day #14    2/1: Lower Anterior resection of the sigmoid colon with colostomy secondary to a stricture caused bu diverticular chronic inflammation.  2/9: Abd/CT: Inflammation of the colon at and just proximal to the colostomy suggests an infection or ischemia. Suspected 5.5x7.6 cm abscess medial to the distal colon and colostomy site. Partial intestinal obstruction.   2/10: Ex Lap. Colon resection. Ostomy revision. Abscess drainage.  2.17: CT Ab/Pelv:  Interval resolution of obstruction. Substantial decrease in size of fluid collection in the central abdomen since the prior exam. Residual thin collection in the ventral right abdomen measuring 5.3 x 1.0 cm. New small collections in the left abdomen adjacent to the drain and in the pelvis.  2/17: Blood Culture: NGTD  2/18: IR placed drain.  2/19: Wound Vac Change.   2/20: Transfer to [facility].  2/21: RN notes purulent bloody drainage in CHUCK.  Only 5 cc documented in past 24 hours.  2/22: Little no no drainage out of CHUCK.  2/24: CHUCK output 15 cc/24 hours, Accordian 40 cc/24 hours.  Taking oxy IR for pain control.  Still weak.  2/25: CHUCK output 0 the past 24 hours, Accordian 25 cc.  IR placed drains 2/18.        Labs Reviewed, Medications Reviewed, Radiology Reviewed, Wound Reviewed, Fluids Reviewed and GI Nutrition Reviewed    Review of Systems:  Review of Systems   Constitutional: Negative for chills, fever and malaise/fatigue.   Respiratory: Negative for cough and sputum production.    Cardiovascular: Negative for chest pain.   Gastrointestinal: Negative for abdominal pain, nausea and vomiting.   Musculoskeletal: Negative for myalgias.   Skin: Negative for rash.   Neurological: Negative for weakness and  headaches.       Physical Exam:  T 98.5 /84  HR 99 RR 20  Physical Exam   Constitutional: He is oriented to person, place, and time. He appears well-developed and well-nourished.   Cardiovascular: Normal rate and regular rhythm.    Pulmonary/Chest: Effort normal and breath sounds normal.   Abdominal: Soft. Bowel sounds are normal.   Ostomy. Drains in place: CHUCK and accordian.  Decreasing output robbie CHUCK.   Musculoskeletal: He exhibits no edema.   Neurological: He is alert and oriented to person, place, and time.   Skin: Skin is warm and dry.   Psychiatric: He has a normal mood and affect.       Labs:  No results found for this or any previous visit (from the past 24 hour(s)).  Results     Procedure Component Value Units Date/Time    MRSA BY PCR (AMP) [[id number]] Collected:  02/20/18 1749    Order Status:  Completed Specimen:  Respirate from Nares Updated:  02/21/18 1012     Significant Indicator NEG     Source RESP     Site NARES     MRSA PCR Negative for MRSA by PCR.    Narrative:       Collected By:[id number] DARIUSZ WOODALL        GI/Nutrition:  Orders Placed This Encounter   Procedures   • DIET ORDER     Standing Status:   Standing     Number of Occurrences:   1     Order Specific Question:   Diet:     Answer:   Regular [1]     Order Specific Question:   Consistency/Fluid modifications:     Answer:   Thin Liquids [3]     Medical Decision Making, by Problem:  1. Sepsis 2/2 an acute intra abdominal abscess with peritonitis.             - Sepsis portion resolved.            - Organisms: Enterococcus, Kleb, PSeud, MSSA, Bacteroides             - Associated with Bacteroides Bacteremia       2. S/p Lower anterior resection of the sigmoid colon with colostomy             - 2/2 a stricture caused by diverticular chronic inflammation.  3. Status post revision colostomy for ischemic colostomy areas with necrosis.  4. Anemia of chronic disease  5. Thombocytosis    Plan:  Continue zosyn/anidulafungin. Anticipate ~ 4-6  weeks total  Duration will be determined by interval CT scans - last 2/17.        - Next CT prudent around 3/3.  Continue wound care and monitor drain activity.  Continue activity improvement.    No new recommendations.  Due for another CT the end of next week.  IR drain placed on 2/18.  CHUCK with no output the past 24 hours.  Repeat CT soon, to see if drain needs to be repositioned vs pulling it out.  ? If Dr Barriga going to see this week in follow up.  Routine labs to be drawn tomorrow.    25 min of care time. >50% of time spent in coordination of care/counseling.

## 2018-02-26 LAB
ALBUMIN SERPL BCP-MCNC: 2.8 G/DL (ref 3.2–4.9)
ALBUMIN/GLOB SERPL: 0.8 G/DL
ALP SERPL-CCNC: 98 U/L (ref 30–99)
ALT SERPL-CCNC: 22 U/L (ref 2–50)
ANION GAP SERPL CALC-SCNC: 5 MMOL/L (ref 0–11.9)
AST SERPL-CCNC: 20 U/L (ref 12–45)
BILIRUB SERPL-MCNC: 0.5 MG/DL (ref 0.1–1.5)
BUN SERPL-MCNC: 9 MG/DL (ref 8–22)
CALCIUM SERPL-MCNC: 9 MG/DL (ref 8.4–10.2)
CHLORIDE SERPL-SCNC: 105 MMOL/L (ref 96–112)
CO2 SERPL-SCNC: 27 MMOL/L (ref 20–33)
CREAT SERPL-MCNC: 0.67 MG/DL (ref 0.5–1.4)
CRP SERPL HS-MCNC: 0.46 MG/DL (ref 0–0.75)
ERYTHROCYTE [DISTWIDTH] IN BLOOD BY AUTOMATED COUNT: 43.6 FL (ref 35.9–50)
ERYTHROCYTE [SEDIMENTATION RATE] IN BLOOD BY WESTERGREN METHOD: 53 MM/HOUR (ref 0–20)
GLOBULIN SER CALC-MCNC: 3.5 G/DL (ref 1.9–3.5)
GLUCOSE SERPL-MCNC: 109 MG/DL (ref 65–99)
HCT VFR BLD AUTO: 33 % (ref 42–52)
HGB BLD-MCNC: 11 G/DL (ref 14–18)
MCH RBC QN AUTO: 29.1 PG (ref 27–33)
MCHC RBC AUTO-ENTMCNC: 33.3 G/DL (ref 33.7–35.3)
MCV RBC AUTO: 87.3 FL (ref 81.4–97.8)
PLATELET # BLD AUTO: 402 K/UL (ref 164–446)
PMV BLD AUTO: 8.8 FL (ref 9–12.9)
POTASSIUM SERPL-SCNC: 3.4 MMOL/L (ref 3.6–5.5)
PROT SERPL-MCNC: 6.3 G/DL (ref 6–8.2)
RBC # BLD AUTO: 3.78 M/UL (ref 4.7–6.1)
SODIUM SERPL-SCNC: 137 MMOL/L (ref 135–145)
WBC # BLD AUTO: 5.6 K/UL (ref 4.8–10.8)

## 2018-02-26 PROCEDURE — 85027 COMPLETE CBC AUTOMATED: CPT

## 2018-02-26 PROCEDURE — 86140 C-REACTIVE PROTEIN: CPT

## 2018-02-26 PROCEDURE — 85652 RBC SED RATE AUTOMATED: CPT

## 2018-02-26 PROCEDURE — 80053 COMPREHEN METABOLIC PANEL: CPT

## 2018-02-26 ASSESSMENT — ENCOUNTER SYMPTOMS
COUGH: 0
WEAKNESS: 0
NAUSEA: 0
FEVER: 0
ABDOMINAL PAIN: 0
CHILLS: 0
VOMITING: 0
SPUTUM PRODUCTION: 0
MYALGIAS: 0
HEADACHES: 0

## 2018-02-26 NOTE — PROGRESS NOTES
Infectious Disease Progress Note    Author: Ariana Tripp M.D. Date & Time created: 2/26/2018  8:56 AM    Interval History:  Zosyn/fluconazole Day #9 ( prev micafungin at [facility]/ anidulafunginsubstituted at [facility], then changed to fluconazole 2/23) (started 2/15)   Abx day #14    2/1: Lower Anterior resection of the sigmoid colon with colostomy secondary to a stricture caused bu diverticular chronic inflammation.  2/9: Abd/CT: Inflammation of the colon at and just proximal to the colostomy suggests an infection or ischemia. Suspected 5.5x7.6 cm abscess medial to the distal colon and colostomy site. Partial intestinal obstruction.   2/10: Ex Lap. Colon resection. Ostomy revision. Abscess drainage.  2.17: CT Ab/Pelv:  Interval resolution of obstruction. Substantial decrease in size of fluid collection in the central abdomen since the prior exam. Residual thin collection in the ventral right abdomen measuring 5.3 x 1.0 cm. New small collections in the left abdomen adjacent to the drain and in the pelvis.  2/17: Blood Culture: NGTD  2/18: IR placed drain.  2/19: Wound Vac Change.   2/20: Transfer to [facility].  2/21: RN notes purulent bloody drainage in CHUCK.  Only 5 cc documented in past 24 hours.  2/22: Little no no drainage out of CHUCK.  2/24: CHUCK output 15 cc/24 hours, Accordian 40 cc/24 hours.  Taking oxy IR for pain control.  Still weak.  2/25: CHUCK output 0 the past 24 hours, Accordian 25 cc.  IR placed drains 2/18.  2/26: Up walking with FWW in the hallways.  CHUCK output 7 cc, Accordian 0      Labs Reviewed, Medications Reviewed, Radiology Reviewed, Wound Reviewed, Fluids Reviewed and GI Nutrition Reviewed    Review of Systems:  Review of Systems   Constitutional: Negative for chills, fever and malaise/fatigue.   Respiratory: Negative for cough and sputum production.    Cardiovascular: Negative for chest pain.   Gastrointestinal: Negative for abdominal pain, nausea and vomiting.   Musculoskeletal: Negative for myalgias.    Skin: Negative for rash.   Neurological: Negative for weakness and headaches.       Physical Exam:  T 98.5  /84  HR 99  RR 20  Physical Exam   Constitutional: He is oriented to person, place, and time. He appears well-developed and well-nourished.   Cardiovascular: Normal rate and regular rhythm.    Pulmonary/Chest: Effort normal and breath sounds normal.   Abdominal: Soft. Bowel sounds are normal.   Ostomy. Drains in place: CHUCK and accordian.  Decreasing output from both drain sites.   Musculoskeletal: He exhibits no edema.   Neurological: He is alert and oriented to person, place, and time.   Skin: Skin is warm and dry.   Psychiatric: He has a normal mood and affect.       Labs:  Recent Results (from the past 24 hour(s))   CRP QUANTITIVE (NON-CARDIAC)    Collection Time: 02/26/18  3:00 AM   Result Value Ref Range    Stat C-Reactive Protein 0.46 0.00 - 0.75 mg/dL   WESTERGREN SED RATE    Collection Time: 02/26/18  3:00 AM   Result Value Ref Range    Sed Rate Westergren 53 (H) 0 - 20 mm/hour   COMP METABOLIC PANEL    Collection Time: 02/26/18  3:00 AM   Result Value Ref Range    Sodium 137 135 - 145 mmol/L    Potassium 3.4 (L) 3.6 - 5.5 mmol/L    Chloride 105 96 - 112 mmol/L    Co2 27 20 - 33 mmol/L    Anion Gap 5.0 0.0 - 11.9    Glucose 109 (H) 65 - 99 mg/dL    Bun 9 8 - 22 mg/dL    Creatinine 0.67 0.50 - 1.40 mg/dL    Calcium 9.0 8.4 - 10.2 mg/dL    AST(SGOT) 20 12 - 45 U/L    ALT(SGPT) 22 2 - 50 U/L    Alkaline Phosphatase 98 30 - 99 U/L    Total Bilirubin 0.5 0.1 - 1.5 mg/dL    Albumin 2.8 (L) 3.2 - 4.9 g/dL    Total Protein 6.3 6.0 - 8.2 g/dL    Globulin 3.5 1.9 - 3.5 g/dL    A-G Ratio 0.8 g/dL   CBC WITHOUT DIFFERENTIAL    Collection Time: 02/26/18  3:00 AM   Result Value Ref Range    WBC 5.6 4.8 - 10.8 K/uL    RBC 3.78 (L) 4.70 - 6.10 M/uL    Hemoglobin 11.0 (L) 14.0 - 18.0 g/dL    Hematocrit 33.0 (L) 42.0 - 52.0 %    MCV 87.3 81.4 - 97.8 fL    MCH 29.1 27.0 - 33.0 pg    MCHC 33.3 (L) 33.7 - 35.3 g/dL     RDW 43.6 35.9 - 50.0 fL    Platelet Count 402 164 - 446 K/uL    MPV 8.8 (L) 9.0 - 12.9 fL   ESTIMATED GFR    Collection Time: 02/26/18  3:00 AM   Result Value Ref Range    GFR If African American >60 >60 mL/min/1.73 m 2    GFR If Non African American >60 >60 mL/min/1.73 m 2     Results     Procedure Component Value Units Date/Time    MRSA BY PCR (AMP) [[id number]] Collected:  02/20/18 5478    Order Status:  Completed Specimen:  Respirate from Nares Updated:  02/21/18 1012     Significant Indicator NEG     Source RESP     Site NARES     MRSA PCR Negative for MRSA by PCR.    Narrative:       Collected By:[id number] DARIUSZ WOODALL        GI/Nutrition:  Orders Placed This Encounter   Procedures   • DIET ORDER     Standing Status:   Standing     Number of Occurrences:   1     Order Specific Question:   Diet:     Answer:   Regular [1]     Order Specific Question:   Consistency/Fluid modifications:     Answer:   Thin Liquids [3]     Medical Decision Making, by Problem:  1. Sepsis 2/2 an acute intra abdominal abscess with peritonitis.             - Sepsis portion resolved.            - Organisms: Enterococcus, Kleb, PSeud, MSSA, Bacteroides             - Associated with Bacteroides Bacteremia       2. S/p Lower anterior resection of the sigmoid colon with colostomy             - 2/2 a stricture caused by diverticular chronic inflammation.  3. Status post revision colostomy for ischemic colostomy areas with necrosis.  4. Anemia of chronic disease  5. Thombocytosis    Plan:  Continue zosyn/fluconazole. Anticipate ~ 4-6 weeks total  Duration will be determined by interval CT scans - last 2/17.        - Next CT this week.  Continue wound care and monitor drain activity.  Continue activity improvement.    No new recommendations.  Due for another CT soon.  IR drain placed on 2/18.  Both CHUCK and Accordian drain with little output.  Repeat CT soon, to see if drain needs to be repositioned vs pulling it out. If the output remain  minimal, would rescan sooner than 3/3.     ? If Dr Barriga going to see this week in follow up.    Labs reviewed. ESR and CRP improving.    25 min of care time. >50% of time spent in coordination of care/counseling.

## 2018-02-27 ASSESSMENT — ENCOUNTER SYMPTOMS
NAUSEA: 0
VOMITING: 0
FEVER: 0
WEAKNESS: 0
CHILLS: 0
DIZZINESS: 0
COUGH: 0
SPUTUM PRODUCTION: 0
ABDOMINAL PAIN: 1
HEADACHES: 0
MYALGIAS: 0

## 2018-02-28 ASSESSMENT — ENCOUNTER SYMPTOMS
FEVER: 0
CHILLS: 0
DIZZINESS: 0
HEADACHES: 0
ABDOMINAL PAIN: 1
WEAKNESS: 0
MYALGIAS: 0
SPUTUM PRODUCTION: 0
NAUSEA: 0
VOMITING: 0
COUGH: 0

## 2018-02-28 NOTE — PROGRESS NOTES
Infectious Disease Progress Note    Author: JOHN Rothman M.D. Date & Time created: 2/27/2018  7:47 PM    Interval History:  Zosyn/fluconazole Day #10 ( prev micafungin at [facility]/ anidul[facility]ung[facility]ubstituted at [facility], then changed to fluconazole 2/23) (started 2/15)   Abx day #15    2/1: Lower Anterior resection of the sigmoid colon with colostomy secondary to a stricture caused bu diverticular chronic inflammation.  2/9: Abd/CT: Inflammation of the colon at and just proximal to the colostomy suggests an infection or ischemia. Suspected 5.5x7.6 cm abscess medial to the distal colon and colostomy site. Partial intestinal obstruction.   2/10: Ex Lap. Colon resection. Ostomy revision. Abscess drainage.  2.17: CT Ab/Pelv:  Interval resolution of obstruction. Substantial decrease in size of fluid collection in the central abdomen since the prior exam. Residual thin collection in the ventral right abdomen measuring 5.3 x 1.0 cm. New small collections in the left abdomen adjacent to the drain and in the pelvis.  2/17: Blood Culture: NGTD  2/18: IR placed drain.  2/19: Wound Vac Change.   2/20: Transfer to [facility].  2/21: RN notes purulent bloody drainage in CHUCK.  Only 5 cc documented in past 24 hours.  2/22: Little no no drainage out of CHUCK.  2/24: CHUCK output 15 cc/24 hours, Accordian 40 cc/24 hours.  Taking oxy IR for pain control.  Still weak.  2/25: CHUCK output 0 the past 24 hours, Accordian 25 cc.  IR placed drains 2/18.  2/26: Up walking with FWW in the hallways.  CHUCK output 7 cc, Accordian 0  2/27: Afebrile and feeling well. Mild lower abdominal discomfort. WBC 5600, ESR 53.    Labs Reviewed, Medications Reviewed, Radiology Reviewed, Wound Reviewed, Fluids Reviewed and GI Nutrition Reviewed    Review of Systems:  Review of Systems   Constitutional: Negative for chills, fever and malaise/fatigue.   Respiratory: Negative for cough and sputum production.    Cardiovascular: Negative for chest pain.   Gastrointestinal: Positive for  abdominal pain (mild ). Negative for nausea and vomiting.   Genitourinary: Negative for dysuria.   Musculoskeletal: Negative for joint pain and myalgias.   Skin: Negative for rash.   Neurological: Negative for dizziness, weakness and headaches.     Physical Exam:  T 98.5  /84  HR 99  RR 20  Physical Exam   Constitutional: He is oriented to person, place, and time. He appears well-developed and well-nourished. No distress.   HENT:   No temporal wasting or alopecia. No malar or other facial rash. No conjunctival injection or petechiae. No intraoral ulcers, nodules or thrush. No cervical lymphadenopathy or JVD.   Cardiovascular: Normal rate and regular rhythm.  Exam reveals no gallop.    No murmur heard.  Pulmonary/Chest: Effort normal and breath sounds normal. No respiratory distress. He has no wheezes. He has no rales.   Abdominal: Soft. Bowel sounds are normal. He exhibits no distension.   LLQ colostomy. Drains in place: CHUCK and accordion.  Decreasing output from both drain sites.   Musculoskeletal: He exhibits no edema.   Neurological: He is alert and oriented to person, place, and time.   Skin: Skin is warm and dry. He is not diaphoretic.   Psychiatric: He has a normal mood and affect.     Labs:  Results     Procedure Component Value Units Date/Time    MRSA BY PCR (AMP) [[id number]] Collected:  02/20/18 1749    Order Status:  Completed Specimen:  Respirate from Nares Updated:  02/21/18 1012     Significant Indicator NEG     Source RESP     Site NARES     MRSA PCR Negative for MRSA by PCR.     Medical Decision Making, by Problem:  1. Sepsis 2/2 an acute intra abdominal abscess with peritonitis.             - Sepsis portion resolved.            - Organisms: Enterococcus, Klebsiella, Pseudomonas, MSSA, Bacteroides             - Associated with Bacteroides Bacteremia       2. S/p Lower anterior resection of the sigmoid colon with colostomy             - 2/2 a stricture caused by diverticular chronic  inflammation.  3. Status post revision colostomy for ischemic colostomy areas with necrosis.  4. Anemia of chronic disease  5. Thombocytosis    Plan:  Continue zosyn/fluconazole. Anticipate ~ 4-6 weeks total  Duration will be determined by interval CT scans - last 2/17.        - Next CT this week.  Continue wound care and monitor drain activity.  Continue activity improvement.    No new recommendations.  Due for another CT about March 3 or 4.  IR drain placed on 2/18.  Both CHUCK and Accordion drain with little output.  Repeat CT soon, to see if drain needs to be repositioned vs pulling it out. If the output remains minimal, would rescan sooner than 3/3.25 min of care time. >50% of time spent in coordination of care/counseling.

## 2018-02-28 NOTE — PROGRESS NOTES
Infectious Disease Progress Note    Author: Ariana Tripp M.D. Date & Time created: 2/28/2018  9:15 AM    Interval History:  Zosyn/fluconazole Day #11 ( prev micafungin at [facility]/ anidulafunginsubstituted at [facility], then changed to fluconazole 2/23) (started 2/15)   Abx day #16    2/1: Lower Anterior resection of the sigmoid colon with colostomy secondary to a stricture caused bu diverticular chronic inflammation.  2/9: Abd/CT: Inflammation of the colon at and just proximal to the colostomy suggests an infection or ischemia. Suspected 5.5x7.6 cm abscess medial to the distal colon and colostomy site. Partial intestinal obstruction.   2/10: Ex Lap. Colon resection. Ostomy revision. Abscess drainage.  2.17: CT Ab/Pelv:  Interval resolution of obstruction. Substantial decrease in size of fluid collection in the central abdomen since the prior exam. Residual thin collection in the ventral right abdomen measuring 5.3 x 1.0 cm. New small collections in the left abdomen adjacent to the drain and in the pelvis.  2/17: Blood Culture: NGTD  2/18: IR placed drain.  2/19: Wound Vac Change.   2/20: Transfer to [facility].  2/21: RN notes purulent bloody drainage in CHUCK.  Only 5 cc documented in past 24 hours.  2/22: Little no no drainage out of CHUCK.  2/24: CHUCK output 15 cc/24 hours, Accordian 40 cc/24 hours.  Taking oxy IR for pain control.  Still weak.  2/25: CHUCK output 0 the past 24 hours, Accordian 25 cc.  IR placed drains 2/18.  2/26: Up walking with FWW in the hallways.  CHUCK output 7 cc, Accordian 0  2/27: Afebrile and feeling well. Mild lower abdominal discomfort. WBC 5600, ESR 53.  2/28: Ambulating in hallway with FWW.  Minimal output from drains.    Labs Reviewed, Medications Reviewed, Radiology Reviewed, Wound Reviewed, Fluids Reviewed and GI Nutrition Reviewed    Review of Systems:  Review of Systems   Constitutional: Negative for chills, fever and malaise/fatigue.   Respiratory: Negative for cough and sputum production.     Cardiovascular: Negative for chest pain.   Gastrointestinal: Positive for abdominal pain (mild ). Negative for nausea and vomiting.   Genitourinary: Negative for dysuria.   Musculoskeletal: Negative for joint pain and myalgias.   Skin: Negative for rash.   Neurological: Negative for dizziness, weakness and headaches.     Physical Exam:  T 98.8  /70  H R 77 RR 18  Physical Exam   Constitutional: He is oriented to person, place, and time. He appears well-developed and well-nourished. No distress.   Cardiovascular: Normal rate and regular rhythm.  Exam reveals no gallop.    No murmur heard.  Pulmonary/Chest: Effort normal and breath sounds normal. No respiratory distress. He has no wheezes. He has no rales.   Abdominal: Soft. Bowel sounds are normal. He exhibits no distension.   LLQ colostomy. Drains in place: CHUCK and accordion.  Decreasing output from both drain sites: CHUCK 5cc/24 hours  Accordian 20 cc/24 hours.   Musculoskeletal: He exhibits no edema.   Neurological: He is alert and oriented to person, place, and time.   Skin: Skin is warm and dry. He is not diaphoretic.   Psychiatric: He has a normal mood and affect.     Labs:  Results     Procedure Component Value Units Date/Time    MRSA BY PCR (AMP) [[id number]] Collected:  02/20/18 1749    Order Status:  Completed Specimen:  Respirate from Nares Updated:  02/21/18 1012     Significant Indicator NEG     Source RESP     Site NARES     MRSA PCR Negative for MRSA by PCR.     Medical Decision Making, by Problem:  1. Sepsis 2/2 an acute intra abdominal abscess with peritonitis.             - Sepsis portion resolved.            - Organisms: Enterococcus, Klebsiella, Pseudomonas, MSSA, Bacteroides             - Associated with Bacteroides Bacteremia       2. S/p Lower anterior resection of the sigmoid colon with colostomy             - 2/2 a stricture caused by diverticular chronic inflammation.  3. Status post revision colostomy for ischemic colostomy areas with  necrosis.  4. Anemia of chronic disease  5. Thombocytosis    Plan:  Continue zosyn/fluconazole. Anticipate ~ 4-6 weeks total  Duration will be determined by interval CT scans - last 2/17.        - Next CT this week.  Continue wound care and monitor drain activity.  Continue activity improvement.    No new recommendations.  Due for another CT ~ 2/3.  IR drain placed on 2/18.  Both CHUCK and Accordion drain with little output.  Repeat CT soon, to see if drain needs to be repositioned vs removing if abscess drained. If the output remains minimal, would rescan sooner than 3/3.    25 min of care time. >50% of time spent in coordination of care/counseling.

## 2018-03-01 ENCOUNTER — APPOINTMENT (OUTPATIENT)
Dept: RADIOLOGY | Facility: MEDICAL CENTER | Age: 58
End: 2018-03-01
Attending: INTERNAL MEDICINE
Payer: COMMERCIAL

## 2018-03-01 PROCEDURE — 700117 HCHG RX CONTRAST REV CODE 255: Performed by: INTERNAL MEDICINE

## 2018-03-01 PROCEDURE — 74177 CT ABD & PELVIS W/CONTRAST: CPT

## 2018-03-01 RX ADMIN — IOHEXOL 50 ML: 240 INJECTION, SOLUTION INTRATHECAL; INTRAVASCULAR; INTRAVENOUS; ORAL at 19:00

## 2018-03-01 RX ADMIN — IOHEXOL 100 ML: 350 INJECTION, SOLUTION INTRAVENOUS at 22:29

## 2018-03-01 ASSESSMENT — ENCOUNTER SYMPTOMS
WEAKNESS: 0
FEVER: 0
HEADACHES: 0
DIZZINESS: 0
SPUTUM PRODUCTION: 0
ABDOMINAL PAIN: 1
COUGH: 0
CHILLS: 0
NAUSEA: 0
VOMITING: 0
MYALGIAS: 0

## 2018-03-02 ASSESSMENT — ENCOUNTER SYMPTOMS
NAUSEA: 0
DIZZINESS: 0
MYALGIAS: 0
CHILLS: 0
VOMITING: 0
SPUTUM PRODUCTION: 0
ABDOMINAL PAIN: 1
WEAKNESS: 0
COUGH: 0
FEVER: 0
HEADACHES: 0

## 2018-03-02 NOTE — PROGRESS NOTES
Infectious Disease Progress Note    Author: JOHN Rothman M.D. Date & Time created: 3/1/2018  5:30 PM    Interval History:  Zosyn/fluconazole Day #12 ( prev micafungin at [facility]/ anidulafunginsubstituted at [facility], then changed to fluconazole 2/23) (started 2/15)   Abx day #17    2/1: Lower Anterior resection of the sigmoid colon with colostomy secondary to a stricture caused bu diverticular chronic inflammation.  2/9: Abd/CT: Inflammation of the colon at and just proximal to the colostomy suggests an infection or ischemia. Suspected 5.5x7.6 cm abscess medial to the distal colon and colostomy site. Partial intestinal obstruction.   2/10: Ex Lap. Colon resection. Ostomy revision. Abscess drainage.  2.17: CT Ab/Pelv:  Interval resolution of obstruction. Substantial decrease in size of fluid collection in the central abdomen since the prior exam. Residual thin collection in the ventral right abdomen measuring 5.3 x 1.0 cm. New small collections in the left abdomen adjacent to the drain and in the pelvis.  2/17: Blood Culture: NGTD  2/18: IR placed drain.  2/19: Wound Vac Change.   2/20: Transfer to [facility].  2/21: RN notes purulent bloody drainage in CHUCK.  Only 5 cc documented in past 24 hours.  2/22: Little no no drainage out of CHUCK.  2/24: CHUCK output 15 cc/24 hours, Accordion 40 cc/24 hours.  Taking oxy IR for pain control.  Still weak.  2/25: CHUCK output 0 the past 24 hours, Accordion 25 cc.  IR placed drains 2/18.  2/26: Up walking with FWW in the hallways.  CHUCK output 7 cc, Accordian 0  2/27: Afebrile and feeling well. Mild lower abdominal discomfort. WBC 5600, ESR 53.  2/28: Ambulating in hallway with FWW.  Minimal output from drains.  3/1:   Remaining afebrile. Feeling well but still has some moderate midabdominal pain.  Labs Reviewed, Medications Reviewed, Radiology Reviewed, Wound Reviewed, Fluids Reviewed and GI Nutrition Reviewed    Review of Systems:  Review of Systems   Constitutional: Negative for chills, fever  and malaise/fatigue.   Respiratory: Negative for cough and sputum production.    Cardiovascular: Negative for chest pain.   Gastrointestinal: Positive for abdominal pain (mild ). Negative for nausea and vomiting.   Genitourinary: Negative for dysuria.   Musculoskeletal: Negative for joint pain and myalgias.   Skin: Negative for rash.   Neurological: Negative for dizziness, weakness and headaches.     Physical Exam:    Physical Exam   Constitutional: He is oriented to person, place, and time. He appears well-developed and well-nourished. No distress.   Cardiovascular: Normal rate and regular rhythm.  Exam reveals no gallop.    No murmur heard.  Pulmonary/Chest: Effort normal and breath sounds normal. No respiratory distress. He has no wheezes. He has no rales.   Abdominal: Soft. Bowel sounds are normal. He exhibits no distension.   LLQ colostomy. Drains in place: CHUCK and accordion.  Decreasing output from both drain sites: CHUCK 5cc/24 hours  Accordian 20 cc/24 hours.   Musculoskeletal: He exhibits no edema.   Neurological: He is alert and oriented to person, place, and time.   Skin: Skin is warm and dry. He is not diaphoretic.   Psychiatric: He has a normal mood and affect.     Labs:  Results     Procedure Component Value Units Date/Time    MRSA BY PCR (AMP) [[id number]] Collected:  02/20/18 3269    Order Status:  Completed Specimen:  Respirate from Nares Updated:  02/21/18 1012     Significant Indicator NEG     Source RESP     Site NARES     MRSA PCR Negative for MRSA by PCR.     Medical Decision Making, by Problem:  1. Sepsis 2/2 an acute intra abdominal abscess with peritonitis.             - Sepsis portion resolved.            - Organisms: Enterococcus, Klebsiella, Pseudomonas, MSSA, Bacteroides.             - Associated with Bacteroides Bacteremia       2. S/p Lower anterior resection of the sigmoid colon with colostomy             - 2/2 a stricture caused by diverticular chronic inflammation.  3. Status post  revision colostomy for ischemic colostomy areas with necrosis.  4. Anemia of chronic disease  5. Thombocytosis    Plan:  Continue zosyn/fluconazole. Anticipate ~ 4-6 weeks total  Duration will be determined by interval CT scans - last 2/17.        - Next CT this week.  Continue wound care and monitor drain activity.  Continue activity improvement.    Due for another CT ~ 3/2.  IR drain placed on 2/18.  Both CHUCK and Accordion drain with little output.  Repeat CT in AM, to see if drain needs to be repositioned vs removing if abscess drained.  25 min of care time. >50% of time spent in coordination of care/counseling.

## 2018-03-03 LAB
ANION GAP SERPL CALC-SCNC: 6 MMOL/L (ref 0–11.9)
BUN SERPL-MCNC: 9 MG/DL (ref 8–22)
CALCIUM SERPL-MCNC: 9 MG/DL (ref 8.4–10.2)
CHLORIDE SERPL-SCNC: 103 MMOL/L (ref 96–112)
CO2 SERPL-SCNC: 28 MMOL/L (ref 20–33)
CREAT SERPL-MCNC: 0.59 MG/DL (ref 0.5–1.4)
GLUCOSE SERPL-MCNC: 113 MG/DL (ref 65–99)
POTASSIUM SERPL-SCNC: 3.4 MMOL/L (ref 3.6–5.5)
SODIUM SERPL-SCNC: 137 MMOL/L (ref 135–145)

## 2018-03-03 PROCEDURE — 80048 BASIC METABOLIC PNL TOTAL CA: CPT

## 2018-03-03 ASSESSMENT — ENCOUNTER SYMPTOMS
HEADACHES: 1
COUGH: 0
VOMITING: 0
WEAKNESS: 0
SPUTUM PRODUCTION: 0
DIZZINESS: 0
MYALGIAS: 0
CHILLS: 0
FEVER: 0
NAUSEA: 1
ABDOMINAL PAIN: 1

## 2018-03-04 LAB
CRP SERPL HS-MCNC: 0.2 MG/DL (ref 0–0.75)
ERYTHROCYTE [DISTWIDTH] IN BLOOD BY AUTOMATED COUNT: 44 FL (ref 35.9–50)
ERYTHROCYTE [SEDIMENTATION RATE] IN BLOOD BY WESTERGREN METHOD: 45 MM/HOUR (ref 0–20)
HCT VFR BLD AUTO: 32.4 % (ref 42–52)
HGB BLD-MCNC: 11 G/DL (ref 14–18)
MCH RBC QN AUTO: 29.3 PG (ref 27–33)
MCHC RBC AUTO-ENTMCNC: 34 G/DL (ref 33.7–35.3)
MCV RBC AUTO: 86.2 FL (ref 81.4–97.8)
PLATELET # BLD AUTO: 287 K/UL (ref 164–446)
PMV BLD AUTO: 9.5 FL (ref 9–12.9)
RBC # BLD AUTO: 3.76 M/UL (ref 4.7–6.1)
WBC # BLD AUTO: 1.9 K/UL (ref 4.8–10.8)

## 2018-03-04 PROCEDURE — 85027 COMPLETE CBC AUTOMATED: CPT

## 2018-03-04 PROCEDURE — 86140 C-REACTIVE PROTEIN: CPT

## 2018-03-04 PROCEDURE — 85652 RBC SED RATE AUTOMATED: CPT

## 2018-03-04 ASSESSMENT — ENCOUNTER SYMPTOMS
WEAKNESS: 0
COUGH: 0
DIZZINESS: 0
MYALGIAS: 0
FEVER: 0
CHILLS: 0
VOMITING: 0
ABDOMINAL PAIN: 1
NAUSEA: 1
SPUTUM PRODUCTION: 0
HEADACHES: 1

## 2018-03-04 NOTE — PROGRESS NOTES
Infectious Disease Progress Note    Author: JOHN Rothman M.D. Date & Time created: 3/3/2018  8:07 PM    Interval History:  Zosyn/fluconazole Day #14 ( prev micafungin at [facility]/ anidulafunginsubstituted at [facility], then changed to fluconazole 2/23) (started 2/15)   Abx day #19    2/1: Lower Anterior resection of the sigmoid colon with colostomy secondary to a stricture caused bu diverticular chronic inflammation.  2/9: Abd/CT: Inflammation of the colon at and just proximal to the colostomy suggests an infection or ischemia. Suspected 5.5x7.6 cm abscess medial to the distal colon and colostomy site. Partial intestinal obstruction.   2/10: Ex Lap. Colon resection. Ostomy revision. Abscess drainage.  2.17: CT Ab/Pelv:  Interval resolution of obstruction. Substantial decrease in size of fluid collection in the central abdomen since the prior exam. Residual thin collection in the ventral right abdomen measuring 5.3 x 1.0 cm. New small collections in the left abdomen adjacent to the drain and in the pelvis.  2/17: Blood Culture: NGTD  2/18: IR placed drain.  2/19: Wound Vac Change.   2/20: Transfer to [facility].  2/21: RN notes purulent bloody drainage in CHUCK.  Only 5 cc documented in past 24 hours.  2/22: Little no no drainage out of CHUCK.  2/24: CHUCK output 15 cc/24 hours, Accordion 40 cc/24 hours.  Taking oxy IR for pain control.  Still weak.  2/25: CHUCK output 0 the past 24 hours, Accordion 25 cc.  IR placed drains 2/18.  2/26: Up walking with FWW in the hallways.  CHUCK output 7 cc, Accordian 0  2/27: Afebrile and feeling well. Mild lower abdominal discomfort. WBC 5600, ESR 53.  2/28: Ambulating in hallway with FWW.  Minimal output from drains.  3/1:   Remaining afebrile. Feeling well but still has some moderate midabdominal pain.  3/2: CT shows resolution of intraabdominal abscesses.  3/3: Remaining afebrile.  Labs Reviewed, Medications Reviewed, Radiology Reviewed, Wound Reviewed, Fluids Reviewed and GI Nutrition  Reviewed    Review of Systems:  Review of Systems   Constitutional: Negative for chills, fever and malaise/fatigue.   Respiratory: Negative for cough and sputum production.    Cardiovascular: Negative for chest pain.   Gastrointestinal: Positive for abdominal pain (mild. Nearly gone.) and nausea (partly relieved by food.). Negative for vomiting.   Genitourinary: Negative for dysuria.   Musculoskeletal: Negative for joint pain and myalgias.   Skin: Negative for rash.   Neurological: Positive for headaches. Negative for dizziness and weakness.     Physical Exam:  Vitals: T 97.8  /74  HR 80  RR 18  Physical Exam   Constitutional: He is oriented to person, place, and time. He appears well-developed and well-nourished. No distress.   Cardiovascular: Normal rate and regular rhythm.  Exam reveals no gallop.    No murmur heard.  Pulmonary/Chest: Effort normal and breath sounds normal. No respiratory distress. He has no wheezes. He has no rales.   Abdominal: Soft. Bowel sounds are normal. He exhibits no distension.   LLQ colostomy.    Musculoskeletal: He exhibits no edema.   Neurological: He is alert and oriented to person, place, and time.   Skin: Skin is warm and dry. He is not diaphoretic.   Psychiatric: He has a normal mood and affect.     Labs:  Results     Procedure Component Value Units Date/Time    MRSA BY PCR (AMP) [[id number]] Collected:  02/20/18 5249    Order Status:  Completed Specimen:  Respirate from Nares Updated:  02/21/18 1012     Significant Indicator NEG     Source RESP     Site NARES     MRSA PCR Negative for MRSA by PCR.     Medical Decision Making, by Problem:  1. Sepsis 2/2 an acute intra abdominal abscess with peritonitis.             - Sepsis portion resolved.            - Organisms: Enterococcus, Klebsiella, Pseudomonas, MSSA, Bacteroides.             - Associated with Bacteroides Bacteremia       2. S/p Lower anterior resection of the sigmoid colon with colostomy             - 2/2 a stricture  caused by diverticular chronic inflammation.  3. Status post revision colostomy for ischemic colostomy areas with necrosis.  4. Anemia of chronic disease  5. Thombocytosis resolved.    Plan:  Continue zosyn/fluconazole for now but consider stepping down to oral agents on Monday at 21 days of Rx.    Suggest Augmentin & Levofloxacin.  Recheck ESR, CRP.   Continue wound care and monitor drain activity.  Continue PT    Removed CHUCK and Accordion drains yesterday.  Discussed with pt, wife and RN  35 min of care time. >50% of time spent in coordination of care/counseling.

## 2018-03-05 LAB
ALBUMIN SERPL BCP-MCNC: 3.2 G/DL (ref 3.2–4.9)
ALBUMIN/GLOB SERPL: 0.9 G/DL
ALP SERPL-CCNC: 84 U/L (ref 30–99)
ALT SERPL-CCNC: 28 U/L (ref 2–50)
ANION GAP SERPL CALC-SCNC: 8 MMOL/L (ref 0–11.9)
AST SERPL-CCNC: 29 U/L (ref 12–45)
BASOPHILS # BLD AUTO: 0.7 % (ref 0–1.8)
BASOPHILS # BLD: 0.02 K/UL (ref 0–0.12)
BILIRUB SERPL-MCNC: 0.3 MG/DL (ref 0.1–1.5)
BUN SERPL-MCNC: 7 MG/DL (ref 8–22)
CALCIUM SERPL-MCNC: 9.2 MG/DL (ref 8.4–10.2)
CHLORIDE SERPL-SCNC: 105 MMOL/L (ref 96–112)
CO2 SERPL-SCNC: 26 MMOL/L (ref 20–33)
COMMENT 1642: NORMAL
CREAT SERPL-MCNC: 0.62 MG/DL (ref 0.5–1.4)
EOSINOPHIL # BLD AUTO: 0.34 K/UL (ref 0–0.51)
EOSINOPHIL NFR BLD: 11.6 % (ref 0–6.9)
ERYTHROCYTE [DISTWIDTH] IN BLOOD BY AUTOMATED COUNT: 43 FL (ref 35.9–50)
GLOBULIN SER CALC-MCNC: 3.7 G/DL (ref 1.9–3.5)
GLUCOSE SERPL-MCNC: 116 MG/DL (ref 65–99)
HCT VFR BLD AUTO: 35.9 % (ref 42–52)
HGB BLD-MCNC: 12.1 G/DL (ref 14–18)
IMM GRANULOCYTES # BLD AUTO: 0 K/UL (ref 0–0.11)
IMM GRANULOCYTES NFR BLD AUTO: 0 % (ref 0–0.9)
LYMPHOCYTES # BLD AUTO: 1.59 K/UL (ref 1–4.8)
LYMPHOCYTES NFR BLD: 54.3 % (ref 22–41)
MCH RBC QN AUTO: 29 PG (ref 27–33)
MCHC RBC AUTO-ENTMCNC: 33.7 G/DL (ref 33.7–35.3)
MCV RBC AUTO: 86.1 FL (ref 81.4–97.8)
MONOCYTES # BLD AUTO: 0.74 K/UL (ref 0–0.85)
MONOCYTES NFR BLD AUTO: 25.3 % (ref 0–13.4)
NEUTROPHILS # BLD AUTO: 0.24 K/UL (ref 1.82–7.42)
NEUTROPHILS NFR BLD: 8.1 % (ref 44–72)
NRBC # BLD AUTO: 0 K/UL
NRBC BLD-RTO: 0 /100 WBC
PLATELET # BLD AUTO: 269 K/UL (ref 164–446)
PMV BLD AUTO: 9.5 FL (ref 9–12.9)
POTASSIUM SERPL-SCNC: 3.1 MMOL/L (ref 3.6–5.5)
PREALB SERPL-MCNC: 23 MG/DL (ref 18–38)
PROT SERPL-MCNC: 6.9 G/DL (ref 6–8.2)
RBC # BLD AUTO: 4.17 M/UL (ref 4.7–6.1)
SODIUM SERPL-SCNC: 139 MMOL/L (ref 135–145)
WBC # BLD AUTO: 2.9 K/UL (ref 4.8–10.8)

## 2018-03-05 PROCEDURE — 84134 ASSAY OF PREALBUMIN: CPT

## 2018-03-05 PROCEDURE — 85025 COMPLETE CBC W/AUTO DIFF WBC: CPT

## 2018-03-05 PROCEDURE — 80053 COMPREHEN METABOLIC PANEL: CPT

## 2018-03-05 ASSESSMENT — ENCOUNTER SYMPTOMS
NAUSEA: 0
MYALGIAS: 0
CHILLS: 0
DIZZINESS: 0
ABDOMINAL PAIN: 1
HEADACHES: 1
FEVER: 0
SPUTUM PRODUCTION: 0
COUGH: 0
WEAKNESS: 0
VOMITING: 0

## 2018-03-05 NOTE — PROGRESS NOTES
Infectious Disease Progress Note    Author: JOHN Rothman M.D. Date & Time created: 3/4/2018  11:25 PM    Interval History:  Zosyn/fluconazole Day #15 ( prev micafungin at [facility]/ anidulafunginsubstituted at [facility], then changed to fluconazole 2/23) (started 2/15)   Abx day #20    2/1: Lower Anterior resection of the sigmoid colon with colostomy secondary to a stricture caused bu diverticular chronic inflammation.  2/9: Abd/CT: Inflammation of the colon at and just proximal to the colostomy suggests an infection or ischemia. Suspected 5.5x7.6 cm abscess medial to the distal colon and colostomy site. Partial intestinal obstruction.   2/10: Ex Lap. Colon resection. Ostomy revision. Abscess drainage.  2.17: CT Ab/Pelv:  Interval resolution of obstruction. Substantial decrease in size of fluid collection in the central abdomen since the prior exam. Residual thin collection in the ventral right abdomen measuring 5.3 x 1.0 cm. New small collections in the left abdomen adjacent to the drain and in the pelvis.  2/17: Blood Culture: NGTD  2/18: IR placed drain.  2/19: Wound Vac Change.   2/20: Transfer to [facility].  2/21: RN notes purulent bloody drainage in CHUCK.  Only 5 cc documented in past 24 hours.  2/22: Little no no drainage out of CHUCK.  2/24: CHUCK output 15 cc/24 hours, Accordion 40 cc/24 hours.  Taking oxy IR for pain control.  Still weak.  2/25: CHUCK output 0 the past 24 hours, Accordion 25 cc.  IR placed drains 2/18.  2/26: Up walking with FWW in the hallways.  CHUCK output 7 cc, Accordian 0  2/27: Afebrile and feeling well. Mild lower abdominal discomfort. WBC 5600, ESR 53.  2/28: Ambulating in hallway with FWW.  Minimal output from drains.  3/1:   Remaining afebrile. Feeling well but still has some moderate midabdominal pain.  3/2: CT shows resolution of intraabdominal abscesses.  3/3: Remaining afebrile.  3/4: Remaining afebrile but now leukopenic at 1900.  ESR 45 down from 80. CRP 0.2 down from 1.28.  Labs Reviewed,  Medications Reviewed, Radiology Reviewed, Wound Reviewed, Fluids Reviewed and GI Nutrition Reviewed    Review of Systems:  Review of Systems   Constitutional: Negative for chills, fever and malaise/fatigue.   Respiratory: Negative for cough and sputum production.    Cardiovascular: Negative for chest pain.   Gastrointestinal: Positive for abdominal pain (mild. Nearly gone.) and nausea (partly relieved by food.). Negative for vomiting.   Genitourinary: Negative for dysuria.   Musculoskeletal: Negative for joint pain and myalgias.   Skin: Negative for rash.   Neurological: Positive for headaches. Negative for dizziness and weakness.     Physical Exam:  Vitals: T. 98.7,  /79,  HR 68,  RR 17.  Physical Exam   Constitutional: He is oriented to person, place, and time. He appears well-developed and well-nourished. No distress.   Cardiovascular: Normal rate and regular rhythm.  Exam reveals no gallop.    No murmur heard.  Pulmonary/Chest: Effort normal and breath sounds normal. No respiratory distress. He has no wheezes. He has no rales.   Abdominal: Soft. Bowel sounds are normal. He exhibits no distension.   LLQ colostomy.    Musculoskeletal: He exhibits no edema.   Neurological: He is alert and oriented to person, place, and time.   Skin: Skin is warm and dry. He is not diaphoretic.   Psychiatric: He has a normal mood and affect.     Labs:  Results     Procedure Component Value Units Date/Time    MRSA BY PCR (AMP) [[id number]] Collected:  02/20/18 1749    Order Status:  Completed Specimen:  Respirate from Nares Updated:  02/21/18 1012     Significant Indicator NEG     Source RESP     Site NARES     MRSA PCR Negative for MRSA by PCR.     Medical Decision Making, by Problem:  1. Sepsis 2/2 an acute intra abdominal abscess with peritonitis.             - Sepsis portion resolved.            - Organisms: Enterococcus, Klebsiella, Pseudomonas, MSSA, Bacteroides.             - Associated with Bacteroides Bacteremia        2. S/p Lower anterior resection of the sigmoid colon with colostomy             - 2/2 a stricture caused by diverticular chronic inflammation.  3. Status post revision colostomy for ischemic colostomy areas with necrosis.  4. Anemia of chronic disease  5. Thombocytosis resolved.  6. Leukopenia secondary to piperacillin-tazobactam.    Plan:  Continue fluconazole for now.  Discontinue piperacillin-tazobactam today for leukopenia   Consider Augmentin & Levofloxacin po tomorrow if WBC up.   If leukopenia persists on Augmentin, then substitute linezolid.    Continue wound care and monitor drain activity.  Continue PT    Removed CHUCK and Accordion drains 3/2.  35 min of care time. >50% of time spent in coordination of care/counseling.

## 2018-03-06 LAB
BASOPHILS # BLD AUTO: 0.3 % (ref 0–1.8)
BASOPHILS # BLD: 0.02 K/UL (ref 0–0.12)
EOSINOPHIL # BLD AUTO: 0.61 K/UL (ref 0–0.51)
EOSINOPHIL NFR BLD: 8.5 % (ref 0–6.9)
ERYTHROCYTE [DISTWIDTH] IN BLOOD BY AUTOMATED COUNT: 42.5 FL (ref 35.9–50)
HCT VFR BLD AUTO: 35.4 % (ref 42–52)
HGB BLD-MCNC: 12.1 G/DL (ref 14–18)
IMM GRANULOCYTES # BLD AUTO: 0.04 K/UL (ref 0–0.11)
IMM GRANULOCYTES NFR BLD AUTO: 0.6 % (ref 0–0.9)
LYMPHOCYTES # BLD AUTO: 1.3 K/UL (ref 1–4.8)
LYMPHOCYTES NFR BLD: 18.1 % (ref 22–41)
MCH RBC QN AUTO: 29.3 PG (ref 27–33)
MCHC RBC AUTO-ENTMCNC: 34.2 G/DL (ref 33.7–35.3)
MCV RBC AUTO: 85.7 FL (ref 81.4–97.8)
MONOCYTES # BLD AUTO: 1.13 K/UL (ref 0–0.85)
MONOCYTES NFR BLD AUTO: 15.7 % (ref 0–13.4)
NEUTROPHILS # BLD AUTO: 4.08 K/UL (ref 1.82–7.42)
NEUTROPHILS NFR BLD: 56.8 % (ref 44–72)
NRBC # BLD AUTO: 0 K/UL
NRBC BLD-RTO: 0 /100 WBC
PLATELET # BLD AUTO: 266 K/UL (ref 164–446)
PMV BLD AUTO: 9.3 FL (ref 9–12.9)
RBC # BLD AUTO: 4.13 M/UL (ref 4.7–6.1)
WBC # BLD AUTO: 7.2 K/UL (ref 4.8–10.8)

## 2018-03-06 PROCEDURE — 85025 COMPLETE CBC W/AUTO DIFF WBC: CPT

## 2018-03-06 ASSESSMENT — ENCOUNTER SYMPTOMS
FEVER: 0
MYALGIAS: 0
CHILLS: 0
SHORTNESS OF BREATH: 0
GASTROINTESTINAL NEGATIVE: 1
ABDOMINAL PAIN: 0
CONSTIPATION: 0
COUGH: 0
DIARRHEA: 0
VOMITING: 0
NAUSEA: 0

## 2018-03-06 NOTE — PROGRESS NOTES
Infectious Disease Progress Note    Author: Shane Fuentes Date & Time created: 3/6/2018  6:57 AM     Zosyn discontinued 3/4 at 15 days.  Fluconazole Day #17 (prev micafungin at [facility]/ anidulafunginsubstituted at [facility], then changed to fluconazole 2/23) (started 2/15)   Abx day #22.     2/1: Lower Anterior resection of the sigmoid colon with colostomy secondary to a stricture caused by diverticular chronic inflammation.  2/9: Abd/CT: Inflammation of the colon at and just proximal to the colostomy suggests an infection or ischemia. Suspected 5.5x7.6 cm abscess medial to the distal colon and colostomy site. Partial intestinal obstruction.   2/10: Ex Lap. Colon resection. Ostomy revision. Abscess drainage.  2.17: CT Ab/Pelv:  Interval resolution of obstruction. Substantial decrease in size of fluid collection in the central abdomen since the prior exam. Residual thin collection in the ventral right abdomen measuring 5.3 x 1.0 cm. New small collections in the left abdomen adjacent to the drain and in the pelvis.  2/17: Blood Culture: NGTD  2/18: IR placed drain.  2/19: Wound Vac Change.   2/20: Transfer to [facility].  2/21: RN notes purulent bloody drainage in CHUCK.  Only 5 cc documented in past 24 hours.  2/22: Little no no drainage out of CHUCK.  2/24: CHUCK output 15 cc/24 hours, Accordion 40 cc/24 hours.  Taking oxy IR for pain control.  Still weak.  2/25: CHUCK output 0 the past 24 hours, Accordion 25 cc.  IR placed drains 2/18.  2/26: Up walking with FWW in the hallways.  CHUCK output 7 cc, Accordian 0  2/27: Afebrile and feeling well. Mild lower abdominal discomfort. WBC 5600, ESR 53.  2/28: Ambulating in hallway with FWW.  Minimal output from drains.  3/1:   Remaining afebrile. Feeling well but still has some moderate midabdominal pain.  3/2: CT shows resolution of intraabdominal abscesses.  3/3: Remaining afebrile.  3/4: Remaining afebrile but now leukopenic at 1900.  ESR 45 down from 80. CRP 0.2 down from 1.28.  3/5: Remaining  afebrile but WBC 2900 with ANC of 240 and eosinophilia of 11.6%.  3/6: No new complaints today - labs redrawn and pending.     Interval History:  Past 24 hrs reviewed with RN's    Labs Reviewed, Medications Reviewed, Radiology Reviewed, Wound Reviewed, Fluids Reviewed and GI Nutrition Reviewed    Temp    Max - 98.7     Now - 98.7    Review of Systems:  Review of Systems   Constitutional: Negative for chills and fever.   Respiratory: Negative for cough and shortness of breath.    Cardiovascular: Negative for chest pain.   Gastrointestinal: Negative.  Negative for abdominal pain, constipation, diarrhea, nausea and vomiting.        Ostomy working fine.   Genitourinary: Negative for dysuria, frequency and urgency.   Musculoskeletal: Negative for myalgias.   Skin: Negative for itching and rash.       Physical Exam:  Physical Exam   Constitutional: He is oriented to person, place, and time. He appears well-developed. He is cooperative.   HENT:   Head: Normocephalic and atraumatic.   Cardiovascular: Normal rate and regular rhythm.    Pulmonary/Chest: Effort normal. No respiratory distress. He has no wheezes.   Abdominal: Soft. He exhibits no distension. There is no tenderness. There is no rebound and no guarding.   Neurological: He is alert and oriented to person, place, and time.   Skin: Skin is warm and dry.   Psychiatric: He has a normal mood and affect. His behavior is normal.   Nursing note and vitals reviewed.      Labs:  No results found for this or any previous visit (from the past 24 hour(s)).  Results     ** No results found for the last 168 hours. **        Hemodynamics:  No data recorded.     No Data Recorded            Wound:        Fluids:  Intake/Output     None           GI/Nutrition:  Orders Placed This Encounter   Procedures   • DIET ORDER     Standing Status:   Standing     Number of Occurrences:   1     Order Specific Question:   Diet:     Answer:   Regular [1]     Comments:   Neutropenic Diet     Order  Specific Question:   Consistency/Fluid modifications:     Answer:   Thin Liquids [3]     Medications:  No current facility-administered medications for this encounter.      Medical Decision Making, by Problem:  1. Sepsis 2/2 an acute intra abdominal abscess with peritonitis.             - Sepsis portion resolved.            - Organisms: Enterococcus, Klebsiella, Pseudomonas, MSSA, Bacteroides.             - Associated with Bacteroides Bacteremia       2. S/P Lower anterior resection of the sigmoid colon with colostomy             - 2/2 a stricture caused by diverticular chronic inflammation.  3. Status post revision colostomy for ischemic colostomy areas with necrosis.  4.  Anemia of chronic disease  5. Thombocytosis resolved.  6.  Hypersensitivity leukopenia/neutropenia secondary to piperacillin-tazobactam.   7. Leukopenia - drug induced.    Plan:  Continue fluconazole for now.  Holding piperacillin-tazobactam 3/4 for leukopenia.  Give another dose of G-CSF if ANC still low.  Continue wound care and monitor drain activity.  Continue PT     He is doing well this AM without new complaints. If his ANC is up today we will resume his Zosyn and keep his ANC up with PRN G-CSF to complete his planned course of IV antibiotic therapy for his intraabdominal abscesses. F/U WBC's over time. Case and treatment reviewed with patient and RN ~ 30 min on floor in direct patient care/counseling with treatment adjustments today. F/U repeat CBC this morning and adjust therapy as indicated.

## 2018-03-06 NOTE — PROGRESS NOTES
Infectious Disease Progress Note    Author: JOHN Rothman M.D. Date & Time created: 3/5/2018  6:43 PM    Interval History:  Zosyn discontinued 3/4 at 15 days.  Fluconazole Day #16 (prev micafungin at [facility]/ anidulafunginsubstituted at [facility], then changed to fluconazole 2/23) (started 2/15)   Abx day #21.    2/1: Lower Anterior resection of the sigmoid colon with colostomy secondary to a stricture caused by diverticular chronic inflammation.  2/9: Abd/CT: Inflammation of the colon at and just proximal to the colostomy suggests an infection or ischemia. Suspected 5.5x7.6 cm abscess medial to the distal colon and colostomy site. Partial intestinal obstruction.   2/10: Ex Lap. Colon resection. Ostomy revision. Abscess drainage.  2.17: CT Ab/Pelv:  Interval resolution of obstruction. Substantial decrease in size of fluid collection in the central abdomen since the prior exam. Residual thin collection in the ventral right abdomen measuring 5.3 x 1.0 cm. New small collections in the left abdomen adjacent to the drain and in the pelvis.  2/17: Blood Culture: NGTD  2/18: IR placed drain.  2/19: Wound Vac Change.   2/20: Transfer to [facility].  2/21: RN notes purulent bloody drainage in CHUCK.  Only 5 cc documented in past 24 hours.  2/22: Little no no drainage out of CHUCK.  2/24: CHUCK output 15 cc/24 hours, Accordion 40 cc/24 hours.  Taking oxy IR for pain control.  Still weak.  2/25: CHUCK output 0 the past 24 hours, Accordion 25 cc.  IR placed drains 2/18.  2/26: Up walking with FWW in the hallways.  CHUCK output 7 cc, Accordian 0  2/27: Afebrile and feeling well. Mild lower abdominal discomfort. WBC 5600, ESR 53.  2/28: Ambulating in hallway with FWW.  Minimal output from drains.  3/1:   Remaining afebrile. Feeling well but still has some moderate midabdominal pain.  3/2: CT shows resolution of intraabdominal abscesses.  3/3: Remaining afebrile.  3/4: Remaining afebrile but now leukopenic at 1900.  ESR 45 down from 80. CRP 0.2 down from  1.28.  3/5: Remaining afebrile but WBC 2900 with ANC of 240 and eosinophilia of 11.6%.  Labs Reviewed, Medications Reviewed, Radiology Reviewed, Wound Reviewed, Fluids Reviewed and GI Nutrition Reviewed    Review of Systems:  Review of Systems   Constitutional: Negative for chills, fever and malaise/fatigue.   Respiratory: Negative for cough and sputum production.    Cardiovascular: Negative for chest pain.   Gastrointestinal: Positive for abdominal pain (mild. Nearly gone.). Negative for nausea (partly relieved by food.) and vomiting.   Genitourinary: Negative for dysuria.   Musculoskeletal: Negative for joint pain and myalgias.   Skin: Negative for rash.   Neurological: Positive for headaches. Negative for dizziness and weakness.     Physical Exam:  Vitals: T. 98.4,  /80,  HR 68,  RR 17.  Physical Exam   Constitutional: He is oriented to person, place, and time. He appears well-developed and well-nourished. No distress.   HENT:   No temporal wasting or alopecia. No malar or other facial rash. No conjunctival injection or petechiae. No intraoral ulcers, nodules or thrush. No cervical lymphadenopathy or JVD.   Cardiovascular: Normal rate and regular rhythm.  Exam reveals no gallop.    No murmur heard.  Pulmonary/Chest: Effort normal and breath sounds normal. No respiratory distress. He has no wheezes. He has no rales.   Abdominal: Soft. Bowel sounds are normal. He exhibits no distension.   LLQ colostomy. Midline abdominal incision almost closed.   Musculoskeletal: He exhibits no edema.   Neurological: He is alert and oriented to person, place, and time.   Skin: Skin is warm and dry. He is not diaphoretic.   Psychiatric: He has a normal mood and affect.   Nursing note and vitals reviewed.    Labs:  Results     Procedure Component Value Units Date/Time    MRSA BY PCR (AMP) [[id number]] Collected:  02/20/18 6749    Order Status:  Completed Specimen:  Respirate from Nares Updated:  02/21/18 1012     Significant  Indicator NEG     Source RESP     Site NARES     MRSA PCR Negative for MRSA by PCR.     Medical Decision Making, by Problem:  1. Sepsis 2/2 an acute intra abdominal abscess with peritonitis.             - Sepsis portion resolved.            - Organisms: Enterococcus, Klebsiella, Pseudomonas, MSSA, Bacteroides.             - Associated with Bacteroides Bacteremia       2. S/p Lower anterior resection of the sigmoid colon with colostomy             - 2/2 a stricture caused by diverticular chronic inflammation.  3. Status post revision colostomy for ischemic colostomy areas with necrosis.  4. Anemia of chronic disease  5. Thombocytosis resolved.  6. Hypersensitivity leukopenia/neutropenia secondary to piperacillin-tazobactam.    Plan:  Continue fluconazole for now.  Discontinued piperacillin-tazobactam 3/4 for leukopenia.   No substitute for now, pending recovery of ANC.  G- mcg now and follow response.  Continue wound care and monitor drain activity.  Continue PT    Removed CHUCK and Accordion drains 3/2.  Discussed with Dr. Neumann, RN, and Dr. Barriga.  35 min of care time. >50% of time spent in coordination of care/counseling.

## 2018-03-07 LAB
ANION GAP SERPL CALC-SCNC: 8 MMOL/L (ref 0–11.9)
BASOPHILS # BLD AUTO: 0 % (ref 0–1.8)
BASOPHILS # BLD: 0 K/UL (ref 0–0.12)
BUN SERPL-MCNC: 10 MG/DL (ref 8–22)
CALCIUM SERPL-MCNC: 9.2 MG/DL (ref 8.4–10.2)
CHLORIDE SERPL-SCNC: 100 MMOL/L (ref 96–112)
CO2 SERPL-SCNC: 28 MMOL/L (ref 20–33)
CREAT SERPL-MCNC: 0.64 MG/DL (ref 0.5–1.4)
EOSINOPHIL # BLD AUTO: 1.18 K/UL (ref 0–0.51)
EOSINOPHIL NFR BLD: 11 % (ref 0–6.9)
ERYTHROCYTE [DISTWIDTH] IN BLOOD BY AUTOMATED COUNT: 44.2 FL (ref 35.9–50)
GIANT PLATELETS BLD QL SMEAR: NORMAL
GLUCOSE SERPL-MCNC: 91 MG/DL (ref 65–99)
HCT VFR BLD AUTO: 35.7 % (ref 42–52)
HGB BLD-MCNC: 12.1 G/DL (ref 14–18)
LYMPHOCYTES # BLD AUTO: 1.71 K/UL (ref 1–4.8)
LYMPHOCYTES NFR BLD: 16 % (ref 22–41)
MACROCYTES BLD QL SMEAR: ABNORMAL
MANUAL DIFF BLD: NORMAL
MCH RBC QN AUTO: 29.2 PG (ref 27–33)
MCHC RBC AUTO-ENTMCNC: 33.9 G/DL (ref 33.7–35.3)
MCV RBC AUTO: 86 FL (ref 81.4–97.8)
METAMYELOCYTES NFR BLD MANUAL: 1 %
MONOCYTES # BLD AUTO: 0.32 K/UL (ref 0–0.85)
MONOCYTES NFR BLD AUTO: 3 % (ref 0–13.4)
NEUTROPHILS # BLD AUTO: 7.38 K/UL (ref 1.82–7.42)
NEUTROPHILS NFR BLD: 69 % (ref 44–72)
NRBC # BLD AUTO: 0 K/UL
NRBC BLD-RTO: 0 /100 WBC
PLATELET # BLD AUTO: 253 K/UL (ref 164–446)
PMV BLD AUTO: 9.7 FL (ref 9–12.9)
POLYCHROMASIA BLD QL SMEAR: NORMAL
POTASSIUM SERPL-SCNC: 3.7 MMOL/L (ref 3.6–5.5)
RBC # BLD AUTO: 4.15 M/UL (ref 4.7–6.1)
RBC BLD AUTO: PRESENT
SODIUM SERPL-SCNC: 136 MMOL/L (ref 135–145)
VARIANT LYMPHS BLD QL SMEAR: NORMAL
WBC # BLD AUTO: 10.7 K/UL (ref 4.8–10.8)

## 2018-03-07 PROCEDURE — 85007 BL SMEAR W/DIFF WBC COUNT: CPT

## 2018-03-07 PROCEDURE — 80048 BASIC METABOLIC PNL TOTAL CA: CPT

## 2018-03-07 PROCEDURE — 85027 COMPLETE CBC AUTOMATED: CPT

## 2018-03-07 ASSESSMENT — ENCOUNTER SYMPTOMS
SPUTUM PRODUCTION: 0
DIZZINESS: 0
ABDOMINAL PAIN: 1
VOMITING: 0
COUGH: 0
HEADACHES: 0
MYALGIAS: 0
WEAKNESS: 0
NAUSEA: 0
FEVER: 0
CHILLS: 0

## 2018-03-07 NOTE — PROGRESS NOTES
Infectious Disease Progress Note    Author: JOHN Rothman M.D. Date & Time created: 3/7/2018  10:02 AM    Interval History:  Zosyn discontinued 3/4 at 15 days. Restarted 3/6.  Fluconazole Day #18 (prev micafungin at [facility]/ anidulafunginsubstituted at [facility], then changed to fluconazole 2/23) (started 2/15)   Abx day #23.    2/1: Lower Anterior resection of the sigmoid colon with colostomy secondary to a stricture caused by diverticular chronic inflammation.  2/9: Abd/CT: Inflammation of the colon at and just proximal to the colostomy suggests an infection or ischemia. Suspected 5.5x7.6 cm abscess medial to the distal colon and colostomy site. Partial intestinal obstruction.   2/10: Ex Lap. Colon resection. Ostomy revision. Abscess drainage.  2.17: CT Ab/Pelv:  Interval resolution of obstruction. Substantial decrease in size of fluid collection in the central abdomen since the prior exam. Residual thin collection in the ventral right abdomen measuring 5.3 x 1.0 cm. New small collections in the left abdomen adjacent to the drain and in the pelvis.  2/17: Blood Culture: NGTD  2/18: IR placed drain.  2/19: Wound Vac Change.   2/20: Transfer to [facility].  2/21: RN notes purulent bloody drainage in CHUCK.  Only 5 cc documented in past 24 hours.  2/22: Little no no drainage out of CHUCK.  2/24: CHUCK output 15 cc/24 hours, Accordion 40 cc/24 hours.  Taking oxy IR for pain control.  Still weak.  2/25: CHUCK output 0 the past 24 hours, Accordion 25 cc.  IR placed drains 2/18.  2/26: Up walking with FWW in the hallways.  CHUCK output 7 cc, Accordian 0  2/27: Afebrile and feeling well. Mild lower abdominal discomfort. WBC 5600, ESR 53.  2/28: Ambulating in hallway with FWW.  Minimal output from drains.  3/1:   Remaining afebrile. Feeling well but still has some moderate midabdominal pain.  3/2: CT shows resolution of intraabdominal abscesses.  3/3: Remaining afebrile.  3/4: Remaining afebrile but now leukopenic at 1900.  ESR 45 down from 80.  CRP 0.2 down from 1.28.  3/5: Remaining afebrile but WBC 2900 with ANC of 240 and eosinophilia of 11.6%.  3/6: No new complaints today - labs redrawn and pending.  3/7: WBC has rebounded to 10,700. ANC 7380 post G-CSF.  Back on piperacillin-tazobactam yesterday.  Labs Reviewed, Medications Reviewed, Radiology Reviewed, Wound Reviewed, Fluids Reviewed and GI Nutrition Reviewed    Review of Systems:  Review of Systems   Constitutional: Negative for chills, fever and malaise/fatigue.   Respiratory: Negative for cough and sputum production.    Cardiovascular: Negative for chest pain.   Gastrointestinal: Positive for abdominal pain (Minimal if at all.). Negative for nausea and vomiting.   Genitourinary: Negative for dysuria.   Musculoskeletal: Negative for joint pain and myalgias.   Skin: Negative for rash.   Neurological: Negative for dizziness, weakness and headaches.     Physical Exam:  Vitals: T. 98.4,  /80,  HR 68,  RR 17.  Physical Exam   Constitutional: He is oriented to person, place, and time. He appears well-developed and well-nourished. No distress.   HENT:   No temporal wasting or alopecia. No malar or other facial rash. No conjunctival injection or petechiae. No intraoral ulcers, nodules or thrush. No cervical lymphadenopathy or JVD.   Cardiovascular: Normal rate and regular rhythm.  Exam reveals no gallop.    No murmur heard.  Pulmonary/Chest: Effort normal and breath sounds normal. No respiratory distress. He has no wheezes. He has no rales.   Abdominal: Soft. Bowel sounds are normal. He exhibits no distension. There is no tenderness.   LLQ colostomy. Midline abdominal incision almost closed. Now measures 0.2 x 4 cm with flat pink base. No depth.   Musculoskeletal: He exhibits no edema.   Neurological: He is alert and oriented to person, place, and time.   Skin: Skin is warm and dry. He is not diaphoretic.   Psychiatric: He has a normal mood and affect.   Nursing note and vitals  reviewed.    Labs:  Results     Procedure Component Value Units Date/Time    MRSA BY PCR (AMP) [[id number]] Collected:  02/20/18 8589    Order Status:  Completed Specimen:  Respirate from Nares Updated:  02/21/18 1012     Significant Indicator NEG     Source RESP     Site NARES     MRSA PCR Negative for MRSA by PCR.     Medical Decision Making, by Problem:  1. Sepsis 2/2 an acute intra abdominal abscess with peritonitis.             - Sepsis portion resolved.            - Organisms: Enterococcus, Klebsiella, Pseudomonas, MSSA, Bacteroides.             - Associated with Bacteroides Bacteremia       2. S/p Lower anterior resection of the sigmoid colon with colostomy             - 2/2 a stricture caused by diverticular chronic inflammation.  3. Status post revision colostomy for ischemic colostomy areas with necrosis.  4. Anemia of chronic disease  5. Thombocytosis resolved.  6. Hypersensitivity leukopenia/neutropenia secondary to piperacillin-tazobactam.    Plan:  Continue fluconazole for now.  Discontinued piperacillin-tazobactam 3/4 for leukopenia.   Monitor WBC & ANC.  G- mcg prn ANC below 1000.  Continue wound care and monitor drain activity.  Continue PT    Removed CHUCK and Accordion drains 3/2.  35 min of care time. >50% of time spent in coordination of care/counseling.

## 2018-03-08 ASSESSMENT — ENCOUNTER SYMPTOMS
COUGH: 0
SHORTNESS OF BREATH: 0
CHILLS: 0
ABDOMINAL PAIN: 0
VOMITING: 0
FEVER: 0
NAUSEA: 0
DIARRHEA: 0
CONSTIPATION: 0

## 2018-03-09 LAB
ANION GAP SERPL CALC-SCNC: 6 MMOL/L (ref 0–11.9)
BASOPHILS # BLD AUTO: 0.4 % (ref 0–1.8)
BASOPHILS # BLD: 0.03 K/UL (ref 0–0.12)
BUN SERPL-MCNC: 10 MG/DL (ref 8–22)
CALCIUM SERPL-MCNC: 8.8 MG/DL (ref 8.4–10.2)
CHLORIDE SERPL-SCNC: 102 MMOL/L (ref 96–112)
CO2 SERPL-SCNC: 27 MMOL/L (ref 20–33)
CREAT SERPL-MCNC: 0.67 MG/DL (ref 0.5–1.4)
EOSINOPHIL # BLD AUTO: 1.2 K/UL (ref 0–0.51)
EOSINOPHIL NFR BLD: 17 % (ref 0–6.9)
ERYTHROCYTE [DISTWIDTH] IN BLOOD BY AUTOMATED COUNT: 44.3 FL (ref 35.9–50)
GLUCOSE SERPL-MCNC: 88 MG/DL (ref 65–99)
HCT VFR BLD AUTO: 35.1 % (ref 42–52)
HGB BLD-MCNC: 12 G/DL (ref 14–18)
IMM GRANULOCYTES # BLD AUTO: 0.1 K/UL (ref 0–0.11)
IMM GRANULOCYTES NFR BLD AUTO: 1.4 % (ref 0–0.9)
LYMPHOCYTES # BLD AUTO: 0.89 K/UL (ref 1–4.8)
LYMPHOCYTES NFR BLD: 12.6 % (ref 22–41)
MCH RBC QN AUTO: 29.2 PG (ref 27–33)
MCHC RBC AUTO-ENTMCNC: 34.2 G/DL (ref 33.7–35.3)
MCV RBC AUTO: 85.4 FL (ref 81.4–97.8)
MONOCYTES # BLD AUTO: 0.98 K/UL (ref 0–0.85)
MONOCYTES NFR BLD AUTO: 13.9 % (ref 0–13.4)
NEUTROPHILS # BLD AUTO: 3.87 K/UL (ref 1.82–7.42)
NEUTROPHILS NFR BLD: 54.7 % (ref 44–72)
NRBC # BLD AUTO: 0 K/UL
NRBC BLD-RTO: 0 /100 WBC
PLATELET # BLD AUTO: 218 K/UL (ref 164–446)
PMV BLD AUTO: 9.4 FL (ref 9–12.9)
POTASSIUM SERPL-SCNC: 3.6 MMOL/L (ref 3.6–5.5)
RBC # BLD AUTO: 4.11 M/UL (ref 4.7–6.1)
SODIUM SERPL-SCNC: 135 MMOL/L (ref 135–145)
WBC # BLD AUTO: 7.1 K/UL (ref 4.8–10.8)

## 2018-03-09 PROCEDURE — 80048 BASIC METABOLIC PNL TOTAL CA: CPT

## 2018-03-09 PROCEDURE — 85025 COMPLETE CBC W/AUTO DIFF WBC: CPT

## 2018-03-09 ASSESSMENT — ENCOUNTER SYMPTOMS
ABDOMINAL PAIN: 1
CHILLS: 0
VOMITING: 0
DIZZINESS: 0
FEVER: 0
MYALGIAS: 0
NAUSEA: 0
COUGH: 0
HEADACHES: 0
WEAKNESS: 0
SPUTUM PRODUCTION: 0

## 2018-03-09 NOTE — PROGRESS NOTES
Infectious Disease Progress Note    Author: JOHN Rothman M.D. Date & Time created: 3/9/2018  1:42 PM    Interval History:  Zosyn discontinued 3/4 at 15 days. Restarted 3/6.  Fluconazole Day #20 (prev micafungin at [facility]/ anidulafunginsubstituted at [facility], then changed to fluconazole 2/23) (started 2/15)   Abx day #25.    2/1: Lower Anterior resection of the sigmoid colon with colostomy secondary to a stricture caused by diverticular chronic inflammation.  2/9: Abd/CT: Inflammation of the colon at and just proximal to the colostomy suggests an infection or ischemia. Suspected 5.5x7.6 cm abscess medial to the distal colon and colostomy site. Partial intestinal obstruction.   2/10: Ex Lap. Colon resection. Ostomy revision. Abscess drainage.  2.17: CT Ab/Pelv:  Interval resolution of obstruction. Substantial decrease in size of fluid collection in the central abdomen since the prior exam. Residual thin collection in the ventral right abdomen measuring 5.3 x 1.0 cm. New small collections in the left abdomen adjacent to the drain and in the pelvis.  2/17: Blood Culture: NGTD  2/18: IR placed drain.  2/19: Wound Vac Change.   2/20: Transfer to [facility].  2/21: RN notes purulent bloody drainage in CHUCK.  Only 5 cc documented in past 24 hours.  2/22: Little no no drainage out of CHUCK.  2/24: CHUCK output 15 cc/24 hours, Accordion 40 cc/24 hours.  Taking oxy IR for pain control.  Still weak.  2/25: CHUCK output 0 the past 24 hours, Accordion 25 cc.  IR placed drains 2/18.  2/26: Up walking with FWW in the hallways.  CHUCK output 7 cc, Accordian 0  2/27: Afebrile and feeling well. Mild lower abdominal discomfort. WBC 5600, ESR 53.  2/28: Ambulating in hallway with FWW.  Minimal output from drains.  3/1:   Remaining afebrile. Feeling well but still has some moderate midabdominal pain.  3/2: CT shows resolution of intraabdominal abscesses.  3/3: Remaining afebrile.  3/4: Remaining afebrile but now leukopenic at 1900.  ESR 45 down from 80.  CRP 0.2 down from 1.28.  3/5: Remaining afebrile but WBC 2900 with ANC of 240 and eosinophilia of 11.6%.  3/6: No new complaints today - labs redrawn and pending.  3/7: WBC has rebounded to 10,700. ANC 7380 post G-CSF.  Back on piperacillin-tazobactam yesterday.  3/8: He is doing well and making good progress treat with antibiotic to 28 total days.   3/9: Afebrile. WBC 7100. ANC 3870. Expects to be discharged Monday.  Labs Reviewed, Medications Reviewed, Radiology Reviewed, Wound Reviewed, Fluids Reviewed and GI Nutrition Reviewed    Review of Systems:  Review of Systems   Constitutional: Negative for chills, fever and malaise/fatigue.   Respiratory: Negative for cough and sputum production.    Cardiovascular: Negative for chest pain.   Gastrointestinal: Positive for abdominal pain (Minimal if at all.). Negative for nausea and vomiting.   Genitourinary: Negative for dysuria.   Musculoskeletal: Negative for joint pain and myalgias.   Skin: Negative for rash.   Neurological: Negative for dizziness, weakness and headaches.     Physical Exam:  Vitals: T. 98.6,  /73,  HR 63,  RR 16.  Physical Exam   Constitutional: He is oriented to person, place, and time. He appears well-developed and well-nourished. No distress.   HENT:   No temporal wasting or alopecia. No malar or other facial rash. No conjunctival injection or petechiae. No intraoral ulcers, nodules or thrush. No cervical lymphadenopathy or JVD.   Cardiovascular: Normal rate and regular rhythm.  Exam reveals no gallop.    No murmur heard.  Pulmonary/Chest: Effort normal and breath sounds normal. No respiratory distress. He has no wheezes. He has no rales.   Abdominal: Soft. Bowel sounds are normal. He exhibits no distension. There is no tenderness.   LLQ colostomy. Midline abdominal incision almost closed. Now measures 0.2 x 4 cm with flat pink base. No depth.   Musculoskeletal: He exhibits no edema.   Neurological: He is alert and oriented to person, place,  and time.   Skin: Skin is warm and dry. He is not diaphoretic.   Psychiatric: He has a normal mood and affect.   Nursing note and vitals reviewed.    Labs:  Results     Procedure Component Value Units Date/Time    MRSA BY PCR (AMP) [[id number]] Collected:  02/20/18 1742    Order Status:  Completed Specimen:  Respirate from Nares Updated:  02/21/18 1012     Significant Indicator NEG     Source RESP     Site NARES     MRSA PCR Negative for MRSA by PCR.     Medical Decision Making, by Problem:  1. Sepsis 2/2 an acute intra abdominal abscess with peritonitis.             - Sepsis portion resolved.            - Organisms: Enterococcus, Klebsiella, Pseudomonas, MSSA, Bacteroides.             - Associated with Bacteroides Bacteremia       2. S/p Lower anterior resection of the sigmoid colon with colostomy             - 2/2 a stricture caused by diverticular chronic inflammation.  3. Status post revision colostomy for ischemic colostomy areas with necrosis.  4. Anemia of chronic disease  5. Thombocytosis resolved.  6. Hypersensitivity leukopenia/neutropenia secondary to piperacillin-tazobactam.    Plan:  Continue fluconazole for now.  Discontinued piperacillin-tazobactam 3/4 for leukopenia. Resumed 3/6.   Monitor WBC & ANC. We should be able to stop at discharge. Would stop sooner if ANC drops below 1500.  G- mcg prn ANC below 1000, but may not be necessary if we just stop the piperacillin-tazobactam.  Continue wound care and monitor drain activity.  Continue PT    Removed CHUCK and Accordion drains 3/2.  35 min of care time. >50% of time spent in coordination of care/counseling.

## 2018-03-10 LAB
BASOPHILS # BLD AUTO: 0.6 % (ref 0–1.8)
BASOPHILS # BLD: 0.04 K/UL (ref 0–0.12)
EOSINOPHIL # BLD AUTO: 1.05 K/UL (ref 0–0.51)
EOSINOPHIL NFR BLD: 16.9 % (ref 0–6.9)
ERYTHROCYTE [DISTWIDTH] IN BLOOD BY AUTOMATED COUNT: 43.8 FL (ref 35.9–50)
HCT VFR BLD AUTO: 36.2 % (ref 42–52)
HGB BLD-MCNC: 12.1 G/DL (ref 14–18)
IMM GRANULOCYTES # BLD AUTO: 0.19 K/UL (ref 0–0.11)
IMM GRANULOCYTES NFR BLD AUTO: 3.1 % (ref 0–0.9)
LYMPHOCYTES # BLD AUTO: 0.88 K/UL (ref 1–4.8)
LYMPHOCYTES NFR BLD: 14.1 % (ref 22–41)
MCH RBC QN AUTO: 28.4 PG (ref 27–33)
MCHC RBC AUTO-ENTMCNC: 33.4 G/DL (ref 33.7–35.3)
MCV RBC AUTO: 85 FL (ref 81.4–97.8)
MONOCYTES # BLD AUTO: 0.84 K/UL (ref 0–0.85)
MONOCYTES NFR BLD AUTO: 13.5 % (ref 0–13.4)
NEUTROPHILS # BLD AUTO: 3.22 K/UL (ref 1.82–7.42)
NEUTROPHILS NFR BLD: 51.8 % (ref 44–72)
NRBC # BLD AUTO: 0 K/UL
NRBC BLD-RTO: 0 /100 WBC
PLATELET # BLD AUTO: 231 K/UL (ref 164–446)
PMV BLD AUTO: 9.3 FL (ref 9–12.9)
RBC # BLD AUTO: 4.26 M/UL (ref 4.7–6.1)
WBC # BLD AUTO: 6.2 K/UL (ref 4.8–10.8)

## 2018-03-10 PROCEDURE — 85025 COMPLETE CBC W/AUTO DIFF WBC: CPT

## 2018-03-10 ASSESSMENT — ENCOUNTER SYMPTOMS
MUSCULOSKELETAL NEGATIVE: 1
PSYCHIATRIC NEGATIVE: 1
DIARRHEA: 0
NEUROLOGICAL NEGATIVE: 1
FEVER: 0
CONSTITUTIONAL NEGATIVE: 1
SHORTNESS OF BREATH: 0
EYES NEGATIVE: 1
GASTROINTESTINAL NEGATIVE: 1
NAUSEA: 0
RESPIRATORY NEGATIVE: 1
CHILLS: 0
HEARTBURN: 0
CARDIOVASCULAR NEGATIVE: 1
COUGH: 0

## 2018-03-10 NOTE — [facility]
Infectious Disease Progress Note    Author: Pradip Andrews Date & Time created: 3/10/2018  12:17 PM    Interval History:  Zosyn stopped 3/4 at 15 days, restarted 3/6.  Fluconazole Day #21 (prev micafungin at [facility]/ anidulafunginsubstituted at [facility], then changed to fluconazole 2/23) (started 2/15)     Abx day #26.     2/1: Lower Anterior resection of the sigmoid colon with colostomy secondary to a stricture caused by diverticular chronic inflammation.  2/9: Abd/CT: Inflammation of the colon at and just proximal to the colostomy suggests an infection or ischemia. Suspected 5.5x7.6 cm abscess medial to the distal colon and colostomy site. Partial intestinal obstruction.   2/10: Ex Lap. Colon resection. Ostomy revision. Abscess drainage.  2.17: CT Ab/Pelv:  Interval resolution of obstruction. Substantial decrease in size of fluid collection in the central abdomen since the prior exam. Residual thin collection in the ventral right abdomen measuring 5.3 x 1.0 cm. New small collections in the left abdomen adjacent to the drain and in the pelvis.  2/17: Blood Culture: NGTD  2/18: IR placed drain.  2/19: Wound Vac Change.   2/20: Transfer to [facility].  2/21: RN notes purulent bloody drainage in CHUCK.  Only 5 cc documented in past 24 hours.  2/22: Little no no drainage out of CHUCK.  2/24: CHUCK output 15 cc/24 hours, Accordion 40 cc/24 hours.  Taking oxy IR for pain control.  Still weak.  2/25: CHUCK output 0 the past 24 hours, Accordion 25 cc.  IR placed drains 2/18.  2/26: Up walking with FWW in the hallways.  CHUCK output 7 cc, Accordian 0  2/27: Afebrile and feeling well. Mild lower abdominal discomfort. WBC 5600, ESR 53.  2/28: Ambulating in hallway with FWW.  Minimal output from drains.  3/1:   Remaining afebrile. Feeling well but still has some moderate midabdominal pain.  3/2: CT shows resolution of intraabdominal abscesses.  3/3: Remaining afebrile.  3/4: Remaining afebrile but now leukopenic at 1900.  ESR 45 down from 80. CRP 0.2  down from 1.28.  3/5: Remaining afebrile but WBC 2900 with ANC of 240 and eosinophilia of 11.6%.  3/6: No new complaints today - labs redrawn and pending.  3/7: WBC has rebounded to 10,700. ANC 7380 post G-CSF.  Back on piperacillin-tazobactam yesterday.  3/8: He is doing well and making good progress treat with antibiotic to 28 total days.   3/9: Afebrile. WBC 7100. ANC 3870. Expects to be discharged Monday.  3/10 ANC 3220. Afebrile    Patient resting in bed, denies N/V/D, no fevers/chills. No abd discomfort. No issues overnight per RN.    Labs Reviewed, Medications Reviewed, Radiology Reviewed, Wound Reviewed, Fluids Reviewed and GI Nutrition Reviewed    Review of Systems:  Review of Systems   Constitutional: Negative.  Negative for chills and fever.   HENT: Negative.    Eyes: Negative.    Respiratory: Negative.  Negative for cough and shortness of breath.    Cardiovascular: Negative.  Negative for chest pain.   Gastrointestinal: Negative.  Negative for diarrhea, heartburn and nausea.   Genitourinary: Negative.    Musculoskeletal: Negative.    Skin: Negative.  Negative for itching and rash.   Neurological: Negative.    Psychiatric/Behavioral: Negative.        Physical Exam:  Physical Exam   Constitutional: He is oriented to person, place, and time. He appears well-developed and well-nourished. No distress.   HENT:   Head: Normocephalic and atraumatic.   Eyes: Pupils are equal, round, and reactive to light.   Cardiovascular: Normal rate, normal heart sounds and intact distal pulses.  Exam reveals no gallop and no friction rub.    No murmur heard.  Pulmonary/Chest: Effort normal and breath sounds normal. No respiratory distress.   Abdominal: Soft. Bowel sounds are normal. There is no tenderness.   Left ostomy with stool. Midline incision healed without erythema or tenderness   Musculoskeletal: Normal range of motion. He exhibits no edema, tenderness or deformity.   Neurological: He is alert and oriented to person,  place, and time.   Skin: Skin is warm and dry. No rash noted. He is not diaphoretic. No erythema. No pallor.   Psychiatric: He has a normal mood and affect. His behavior is normal. Judgment and thought content normal.   Nursing note and vitals reviewed.      Labs:  Recent Results (from the past 24 hour(s))   CBC WITH DIFFERENTIAL    Collection Time: 03/10/18  4:55 AM   Result Value Ref Range    WBC 6.2 4.8 - 10.8 K/uL    RBC 4.26 (L) 4.70 - 6.10 M/uL    Hemoglobin 12.1 (L) 14.0 - 18.0 g/dL    Hematocrit 36.2 (L) 42.0 - 52.0 %    MCV 85.0 81.4 - 97.8 fL    MCH 28.4 27.0 - 33.0 pg    MCHC 33.4 (L) 33.7 - 35.3 g/dL    RDW 43.8 35.9 - 50.0 fL    Platelet Count 231 164 - 446 K/uL    MPV 9.3 9.0 - 12.9 fL    Neutrophils-Polys 51.80 44.00 - 72.00 %    Lymphocytes 14.10 (L) 22.00 - 41.00 %    Monocytes 13.50 (H) 0.00 - 13.40 %    Eosinophils 16.90 (H) 0.00 - 6.90 %    Basophils 0.60 0.00 - 1.80 %    Immature Granulocytes 3.10 (H) 0.00 - 0.90 %    Nucleated RBC 0.00 /100 WBC    Neutrophils (Absolute) 3.22 1.82 - 7.42 K/uL    Lymphs (Absolute) 0.88 (L) 1.00 - 4.80 K/uL    Monos (Absolute) 0.84 0.00 - 0.85 K/uL    Eos (Absolute) 1.05 (H) 0.00 - 0.51 K/uL    Baso (Absolute) 0.04 0.00 - 0.12 K/uL    Immature Granulocytes (abs) 0.19 (H) 0.00 - 0.11 K/uL    NRBC (Absolute) 0.00 K/uL     Results     ** No results found for the last 168 hours. **        Hemodynamics:  No data recorded.     No Data Recorded            Wound:        Fluids:  Intake/Output     None           GI/Nutrition:  Orders Placed This Encounter   Procedures   • DIET ORDER     Standing Status:   Standing     Number of Occurrences:   1     Order Specific Question:   Diet:     Answer:   Regular [1]     Order Specific Question:   Consistency/Fluid modifications:     Answer:   Thin Liquids [3]     Medications:  No current facility-administered medications for this encounter.      Medical Decision Making, by Problem:  1. Sepsis 2/2 an acute intra abdominal abscess  with peritonitis.             - Sepsis portion resolved.            - Organisms: Enterococcus, Klebsiella, Pseudomonas, MSSA, Bacteroides.             - Associated with Bacteroides Bacteremia       2. S/p Lower anterior resection of the sigmoid colon with colostomy             - 2/2 a stricture caused by diverticular chronic inflammation.  3. Status post revision colostomy for ischemic colostomy areas with necrosis.  4. Anemia of chronic disease  5. Thombocytosis resolved.  6. Hypersensitivity leukopenia/neutropenia secondary to piperacillin-tazobactam-resolved       Plan:  1. Continue Zosyn/Fluconazole to complete 28 days,which will be Monday 3/12. WBC's okay today on Zosyn          2. Continue wound care.   3. Encourage OOB, ambulate, in preparation for home on Monday.  4. Plan weekly ID labs on Monday.   5. 25 mins. >50% of time spent in direct care/coordination of care

## 2018-03-11 LAB
ANION GAP SERPL CALC-SCNC: 7 MMOL/L (ref 0–11.9)
BUN SERPL-MCNC: 7 MG/DL (ref 8–22)
CALCIUM SERPL-MCNC: 9.1 MG/DL (ref 8.4–10.2)
CHLORIDE SERPL-SCNC: 104 MMOL/L (ref 96–112)
CO2 SERPL-SCNC: 27 MMOL/L (ref 20–33)
CREAT SERPL-MCNC: 0.65 MG/DL (ref 0.5–1.4)
ERYTHROCYTE [DISTWIDTH] IN BLOOD BY AUTOMATED COUNT: 44.2 FL (ref 35.9–50)
GLUCOSE SERPL-MCNC: 88 MG/DL (ref 65–99)
HCT VFR BLD AUTO: 36.5 % (ref 42–52)
HGB BLD-MCNC: 12.3 G/DL (ref 14–18)
MCH RBC QN AUTO: 28.7 PG (ref 27–33)
MCHC RBC AUTO-ENTMCNC: 33.7 G/DL (ref 33.7–35.3)
MCV RBC AUTO: 85.1 FL (ref 81.4–97.8)
PLATELET # BLD AUTO: 206 K/UL (ref 164–446)
PMV BLD AUTO: 9.4 FL (ref 9–12.9)
POTASSIUM SERPL-SCNC: 3.7 MMOL/L (ref 3.6–5.5)
RBC # BLD AUTO: 4.29 M/UL (ref 4.7–6.1)
SODIUM SERPL-SCNC: 138 MMOL/L (ref 135–145)
WBC # BLD AUTO: 5.2 K/UL (ref 4.8–10.8)

## 2018-03-11 PROCEDURE — 85027 COMPLETE CBC AUTOMATED: CPT

## 2018-03-11 PROCEDURE — 80048 BASIC METABOLIC PNL TOTAL CA: CPT

## 2018-03-11 ASSESSMENT — ENCOUNTER SYMPTOMS
VOMITING: 0
DIARRHEA: 0
NEUROLOGICAL NEGATIVE: 1
MUSCULOSKELETAL NEGATIVE: 1
RESPIRATORY NEGATIVE: 1
SHORTNESS OF BREATH: 0
ABDOMINAL PAIN: 1
CHILLS: 0
EYES NEGATIVE: 1
NAUSEA: 0
COUGH: 0
CARDIOVASCULAR NEGATIVE: 1
PSYCHIATRIC NEGATIVE: 1
FEVER: 0

## 2018-03-11 NOTE — PROGRESS NOTES
Infectious Disease Progress Note    Author: Pradip Andrews Date & Time created: 3/11/2018  11:34 AM    Interval History:  Zosyn stopped 3/4 at 15 days, restarted 3/6.  Fluconazole Day #22 (prev micafungin at [facility]/ anidulafunginsubstituted at [facility], then changed to fluconazole 2/23) (started 2/15)      Abx day #27.     2/1: Lower Anterior resection of the sigmoid colon with colostomy secondary to a stricture caused by diverticular chronic inflammation.  2/9: Abd/CT: Inflammation of the colon at and just proximal to the colostomy suggests an infection or ischemia. Suspected 5.5x7.6 cm abscess medial to the distal colon and colostomy site. Partial intestinal obstruction.   2/10: Ex Lap. Colon resection. Ostomy revision. Abscess drainage.  2.17: CT Ab/Pelv:  Interval resolution of obstruction. Substantial decrease in size of fluid collection in the central abdomen since the prior exam. Residual thin collection in the ventral right abdomen measuring 5.3 x 1.0 cm. New small collections in the left abdomen adjacent to the drain and in the pelvis.  2/17: Blood Culture: NGTD  2/18: IR placed drain.  2/19: Wound Vac Change.   2/20: Transfer to [facility].  2/21: RN notes purulent bloody drainage in CHUCK.  Only 5 cc documented in past 24 hours.  2/22: Little no no drainage out of CHUCK.  2/24: CHUCK output 15 cc/24 hours, Accordion 40 cc/24 hours.  Taking oxy IR for pain control.  Still weak.  2/25: CHUCK output 0 the past 24 hours, Accordion 25 cc.  IR placed drains 2/18.  2/26: Up walking with FWW in the hallways.  CHUCK output 7 cc, Accordian 0  2/27: Afebrile and feeling well. Mild lower abdominal discomfort. WBC 5600, ESR 53.  2/28: Ambulating in hallway with FWW.  Minimal output from drains.  3/1:   Remaining afebrile. Feeling well but still has some moderate midabdominal pain.  3/2: CT shows resolution of intraabdominal abscesses.  3/3: Remaining afebrile.  3/4: Remaining afebrile but now leukopenic at 1900.  ESR 45 down from 80. CRP 0.2  "down from 1.28.  3/5: Remaining afebrile but WBC 2900 with ANC of 240 and eosinophilia of 11.6%.  3/6: No new complaints today - labs redrawn and pending.  3/7: WBC has rebounded to 10,700. ANC 7380 post G-CSF.  Back on piperacillin-tazobactam yesterday.  3/8: He is doing well and making good progress treat with antibiotic to 28 total days.   3/9: Afebrile. WBC 7100. ANC 3870. Expects to be discharged Monday.  3/10 ANC 3220. Afebrile    Patient sitting up at EOB eating lunch. Denies N/V/D, no fevers/chills. Reports a \"pulling\" type pain to right lateral abd.     Labs Reviewed, Medications Reviewed, Radiology Reviewed, Wound Reviewed, Fluids Reviewed and GI Nutrition Reviewed    Review of Systems:  Review of Systems   Constitutional: Negative for chills and fever.   HENT: Negative.    Eyes: Negative.    Respiratory: Negative.  Negative for cough and shortness of breath.    Cardiovascular: Negative.  Negative for chest pain.   Gastrointestinal: Positive for abdominal pain (Right lateral abd. with movement). Negative for diarrhea, nausea and vomiting.   Genitourinary: Negative.    Musculoskeletal: Negative.    Skin: Negative.  Negative for itching and rash.   Neurological: Negative.    Psychiatric/Behavioral: Negative.        Physical Exam:  Physical Exam   Constitutional: He appears well-developed and well-nourished.   HENT:   Head: Normocephalic and atraumatic.   Eyes: Pupils are equal, round, and reactive to light.   Cardiovascular: Normal rate, normal heart sounds and intact distal pulses.  Exam reveals no gallop and no friction rub.    No murmur heard.  Pulmonary/Chest: Effort normal and breath sounds normal. No respiratory distress.   Abdominal: Soft. Bowel sounds are normal. He exhibits no distension.   Ostomy with stool. Midline incision healed.   No pain on palpation to right lateral abdomen   Nursing note and vitals reviewed.      Labs:  Recent Results (from the past 24 hour(s))   CBC WITHOUT DIFFERENTIAL    " Collection Time: 03/11/18  5:32 AM   Result Value Ref Range    WBC 5.2 4.8 - 10.8 K/uL    RBC 4.29 (L) 4.70 - 6.10 M/uL    Hemoglobin 12.3 (L) 14.0 - 18.0 g/dL    Hematocrit 36.5 (L) 42.0 - 52.0 %    MCV 85.1 81.4 - 97.8 fL    MCH 28.7 27.0 - 33.0 pg    MCHC 33.7 33.7 - 35.3 g/dL    RDW 44.2 35.9 - 50.0 fL    Platelet Count 206 164 - 446 K/uL    MPV 9.4 9.0 - 12.9 fL   BASIC METABOLIC PANEL    Collection Time: 03/11/18  5:32 AM   Result Value Ref Range    Sodium 138 135 - 145 mmol/L    Potassium 3.7 3.6 - 5.5 mmol/L    Chloride 104 96 - 112 mmol/L    Co2 27 20 - 33 mmol/L    Glucose 88 65 - 99 mg/dL    Bun 7 (L) 8 - 22 mg/dL    Creatinine 0.65 0.50 - 1.40 mg/dL    Calcium 9.1 8.4 - 10.2 mg/dL    Anion Gap 7.0 0.0 - 11.9   ESTIMATED GFR    Collection Time: 03/11/18  5:32 AM   Result Value Ref Range    GFR If African American >60 >60 mL/min/1.73 m 2    GFR If Non African American >60 >60 mL/min/1.73 m 2     Results     ** No results found for the last 168 hours. **        Hemodynamics:  No data recorded.     No Data Recorded            Wound:        Fluids:  Intake/Output     None           GI/Nutrition:  Orders Placed This Encounter   Procedures   • DIET ORDER     Standing Status:   Standing     Number of Occurrences:   1     Order Specific Question:   Diet:     Answer:   Regular [1]     Order Specific Question:   Consistency/Fluid modifications:     Answer:   Thin Liquids [3]     Medications:  No current facility-administered medications for this encounter.      Medical Decision Making, by Problem:  1. Sepsis 2/2 an acute intra abdominal abscess with peritonitis.             - Sepsis portion resolved.            - Organisms: Enterococcus, Klebsiella, Pseudomonas, MSSA, Bacteroides.             - Associated with Bacteroides Bacteremia       2. S/p Lower anterior resection of the sigmoid colon with colostomy             - 2/2 a stricture caused by diverticular chronic inflammation.  3. Status post revision colostomy  for ischemic colostomy areas with necrosis.  4. Anemia of chronic disease  5. Thombocytosis resolved.  6. Hypersensitivity leukopenia/neutropenia secondary to piperacillin-tazobactam-resolved  7. Right lateral abd pain with movement, likely Muscular         Plan:  1. Continue Zosyn/Fluconazole, d/c'ing tomorrow after 28 days of therapy.    2. Continue wound care.   3. Encourage OOB, ambulate, in preparation for home on Monday.  4. Plan weekly ID labs in AM  5. 25 mins. >50% of time spent in direct care/coordination of care  6. D/w Patient, family, RN and in collaboration with Dr. Fuentes

## 2018-03-12 LAB
ALBUMIN SERPL BCP-MCNC: 3.4 G/DL (ref 3.2–4.9)
ALBUMIN/GLOB SERPL: 1 G/DL
ALP SERPL-CCNC: 95 U/L (ref 30–99)
ALT SERPL-CCNC: 41 U/L (ref 2–50)
ANION GAP SERPL CALC-SCNC: 7 MMOL/L (ref 0–11.9)
AST SERPL-CCNC: 36 U/L (ref 12–45)
BASOPHILS # BLD AUTO: 0.5 % (ref 0–1.8)
BASOPHILS # BLD: 0.03 K/UL (ref 0–0.12)
BILIRUB SERPL-MCNC: 0.7 MG/DL (ref 0.1–1.5)
BUN SERPL-MCNC: 7 MG/DL (ref 8–22)
CALCIUM SERPL-MCNC: 9.3 MG/DL (ref 8.4–10.2)
CHLORIDE SERPL-SCNC: 103 MMOL/L (ref 96–112)
CO2 SERPL-SCNC: 28 MMOL/L (ref 20–33)
CREAT SERPL-MCNC: 0.7 MG/DL (ref 0.5–1.4)
CRP SERPL HS-MCNC: 0.31 MG/DL (ref 0–0.75)
CRP SERPL HS-MCNC: 3 MG/L (ref 0–7.5)
EOSINOPHIL # BLD AUTO: 0.71 K/UL (ref 0–0.51)
EOSINOPHIL NFR BLD: 13 % (ref 0–6.9)
ERYTHROCYTE [DISTWIDTH] IN BLOOD BY AUTOMATED COUNT: 44 FL (ref 35.9–50)
ERYTHROCYTE [SEDIMENTATION RATE] IN BLOOD BY WESTERGREN METHOD: 29 MM/HOUR (ref 0–20)
GLOBULIN SER CALC-MCNC: 3.4 G/DL (ref 1.9–3.5)
GLUCOSE SERPL-MCNC: 105 MG/DL (ref 65–99)
HCT VFR BLD AUTO: 37.8 % (ref 42–52)
HGB BLD-MCNC: 12.7 G/DL (ref 14–18)
IMM GRANULOCYTES # BLD AUTO: 0.04 K/UL (ref 0–0.11)
IMM GRANULOCYTES NFR BLD AUTO: 0.7 % (ref 0–0.9)
LYMPHOCYTES # BLD AUTO: 1.14 K/UL (ref 1–4.8)
LYMPHOCYTES NFR BLD: 20.8 % (ref 22–41)
MCH RBC QN AUTO: 28.7 PG (ref 27–33)
MCHC RBC AUTO-ENTMCNC: 33.6 G/DL (ref 33.7–35.3)
MCV RBC AUTO: 85.5 FL (ref 81.4–97.8)
MONOCYTES # BLD AUTO: 0.68 K/UL (ref 0–0.85)
MONOCYTES NFR BLD AUTO: 12.4 % (ref 0–13.4)
NEUTROPHILS # BLD AUTO: 2.87 K/UL (ref 1.82–7.42)
NEUTROPHILS NFR BLD: 52.6 % (ref 44–72)
NRBC # BLD AUTO: 0 K/UL
NRBC BLD-RTO: 0 /100 WBC
PLATELET # BLD AUTO: 213 K/UL (ref 164–446)
PMV BLD AUTO: 9.8 FL (ref 9–12.9)
POTASSIUM SERPL-SCNC: 3.6 MMOL/L (ref 3.6–5.5)
PROT SERPL-MCNC: 6.8 G/DL (ref 6–8.2)
RBC # BLD AUTO: 4.42 M/UL (ref 4.7–6.1)
SODIUM SERPL-SCNC: 138 MMOL/L (ref 135–145)
WBC # BLD AUTO: 5.5 K/UL (ref 4.8–10.8)

## 2018-03-12 PROCEDURE — 86140 C-REACTIVE PROTEIN: CPT

## 2018-03-12 PROCEDURE — 85652 RBC SED RATE AUTOMATED: CPT

## 2018-03-12 PROCEDURE — 86141 C-REACTIVE PROTEIN HS: CPT

## 2018-03-12 PROCEDURE — 85025 COMPLETE CBC W/AUTO DIFF WBC: CPT

## 2018-03-12 PROCEDURE — 80053 COMPREHEN METABOLIC PANEL: CPT

## 2018-03-12 ASSESSMENT — ENCOUNTER SYMPTOMS
FEVER: 0
NAUSEA: 0
DIARRHEA: 0
CHILLS: 0
MYALGIAS: 0
CONSTIPATION: 0
VOMITING: 0
ABDOMINAL PAIN: 0
COUGH: 0
WEAKNESS: 0
SHORTNESS OF BREATH: 0

## 2018-03-12 NOTE — PROGRESS NOTES
Infectious Disease Progress Note    Author: Shane BARNEY Alfredo Date & Time created: 3/12/2018  7:30 AM     Zosyn stopped 3/4 at 15 days, restarted 3/6.  Fluconazole Day #22 (prev micafungin at [facility]/ anidulafunginsubstituted at [facility], then changed to fluconazole 2/23) (started 2/15)      Abx day #28    2/1: Lower Anterior resection of the sigmoid colon with colostomy secondary to a stricture caused by diverticular chronic inflammation.  2/9: Abd/CT: Inflammation of the colon at and just proximal to the colostomy suggests an infection or ischemia. Suspected 5.5x7.6 cm abscess medial to the distal colon and colostomy site. Partial intestinal obstruction.   2/10: Ex Lap. Colon resection. Ostomy revision. Abscess drainage.  2.17: CT Ab/Pelv:  Interval resolution of obstruction. Substantial decrease in size of fluid collection in the central abdomen since the prior exam. Residual thin collection in the ventral right abdomen measuring 5.3 x 1.0 cm. New small collections in the left abdomen adjacent to the drain and in the pelvis.  2/17: Blood Culture: NGTD  2/18: IR placed drain.  2/19: Wound Vac Change.   2/20: Transfer to [facility].  2/21: RN notes purulent bloody drainage in CHUCK.  Only 5 cc documented in past 24 hours.  2/22: Little no no drainage out of CHUCK.  2/24: CHUCK output 15 cc/24 hours, Accordion 40 cc/24 hours.  Taking oxy IR for pain control.  Still weak.  2/25: CHUCK output 0 the past 24 hours, Accordion 25 cc.  IR placed drains 2/18.  2/26: Up walking with FWW in the hallways.  CHUCK output 7 cc, Accordian 0  2/27: Afebrile and feeling well. Mild lower abdominal discomfort. WBC 5600, ESR 53.  2/28: Ambulating in hallway with FWW.  Minimal output from drains.  3/1:   Remaining afebrile. Feeling well but still has some moderate midabdominal pain.  3/2: CT shows resolution of intraabdominal abscesses.  3/3: Remaining afebrile.  3/4: Remaining afebrile but now leukopenic at 1900.  ESR 45 down from 80. CRP 0.2 down from  1.28.  3/5: Remaining afebrile but WBC 2900 with ANC of 240 and eosinophilia of 11.6%.  3/6: No new complaints today - labs redrawn and pending.  3/7: WBC has rebounded to 10,700. ANC 7380 post G-CSF.  Back on piperacillin-tazobactam yesterday.  3/8: He is doing well and making good progress treat with antibiotic to 28 total days.   3/9: Afebrile. WBC 7100. ANC 3870. Expects to be discharged Monday.  3/10 ANC 3220. Afebrile.  3/12: He is doing well and finishes antibiotics today.    Interval History:  Past 24 hrs reviewed with RN.    Labs Reviewed, Medications Reviewed, Radiology Reviewed, Wound Reviewed, Fluids Reviewed and GI Nutrition Reviewed    Temp.    Max - 98.3    Now - 98.3   Last Charted    Review of Systems:  Review of Systems   Constitutional: Negative for chills and fever.   Respiratory: Negative for cough and shortness of breath.    Cardiovascular: Negative for chest pain.   Gastrointestinal: Negative for abdominal pain, constipation, diarrhea, nausea and vomiting.   Genitourinary: Negative for dysuria, frequency and urgency.   Musculoskeletal: Negative for myalgias.   Skin: Negative for itching and rash.   Neurological: Negative for weakness.       Physical Exam:  Physical Exam   Constitutional: He is oriented to person, place, and time. He appears well-developed and well-nourished. He is cooperative.   Cardiovascular: Normal rate and regular rhythm.    Pulmonary/Chest: Effort normal. No respiratory distress. He has no wheezes.   Abdominal: Soft. He exhibits no distension. There is no tenderness. There is no rebound and no guarding.   Neurological: He is alert and oriented to person, place, and time.   Skin: Skin is warm and dry.   Psychiatric: He has a normal mood and affect. His behavior is normal.   Nursing note and vitals reviewed.      Labs:  Recent Results (from the past 24 hour(s))   CBC WITH DIFFERENTIAL    Collection Time: 03/12/18  3:20 AM   Result Value Ref Range    WBC 5.5 4.8 - 10.8 K/uL     RBC 4.42 (L) 4.70 - 6.10 M/uL    Hemoglobin 12.7 (L) 14.0 - 18.0 g/dL    Hematocrit 37.8 (L) 42.0 - 52.0 %    MCV 85.5 81.4 - 97.8 fL    MCH 28.7 27.0 - 33.0 pg    MCHC 33.6 (L) 33.7 - 35.3 g/dL    RDW 44.0 35.9 - 50.0 fL    Platelet Count 213 164 - 446 K/uL    MPV 9.8 9.0 - 12.9 fL    Neutrophils-Polys 52.60 44.00 - 72.00 %    Lymphocytes 20.80 (L) 22.00 - 41.00 %    Monocytes 12.40 0.00 - 13.40 %    Eosinophils 13.00 (H) 0.00 - 6.90 %    Basophils 0.50 0.00 - 1.80 %    Immature Granulocytes 0.70 0.00 - 0.90 %    Nucleated RBC 0.00 /100 WBC    Neutrophils (Absolute) 2.87 1.82 - 7.42 K/uL    Lymphs (Absolute) 1.14 1.00 - 4.80 K/uL    Monos (Absolute) 0.68 0.00 - 0.85 K/uL    Eos (Absolute) 0.71 (H) 0.00 - 0.51 K/uL    Baso (Absolute) 0.03 0.00 - 0.12 K/uL    Immature Granulocytes (abs) 0.04 0.00 - 0.11 K/uL    NRBC (Absolute) 0.00 K/uL   COMP METABOLIC PANEL    Collection Time: 03/12/18  3:20 AM   Result Value Ref Range    Sodium 138 135 - 145 mmol/L    Potassium 3.6 3.6 - 5.5 mmol/L    Chloride 103 96 - 112 mmol/L    Co2 28 20 - 33 mmol/L    Anion Gap 7.0 0.0 - 11.9    Glucose 105 (H) 65 - 99 mg/dL    Bun 7 (L) 8 - 22 mg/dL    Creatinine 0.70 0.50 - 1.40 mg/dL    Calcium 9.3 8.4 - 10.2 mg/dL    AST(SGOT) 36 12 - 45 U/L    ALT(SGPT) 41 2 - 50 U/L    Alkaline Phosphatase 95 30 - 99 U/L    Total Bilirubin 0.7 0.1 - 1.5 mg/dL    Albumin 3.4 3.2 - 4.9 g/dL    Total Protein 6.8 6.0 - 8.2 g/dL    Globulin 3.4 1.9 - 3.5 g/dL    A-G Ratio 1.0 g/dL   WESTERGREN SED RATE    Collection Time: 03/12/18  3:20 AM   Result Value Ref Range    Sed Rate Westergren 29 (H) 0 - 20 mm/hour   CRP QUANTITIVE (NON-CARDIAC)    Collection Time: 03/12/18  3:20 AM   Result Value Ref Range    Stat C-Reactive Protein 0.31 0.00 - 0.75 mg/dL   ESTIMATED GFR    Collection Time: 03/12/18  3:20 AM   Result Value Ref Range    GFR If African American >60 >60 mL/min/1.73 m 2    GFR If Non African American >60 >60 mL/min/1.73 m 2   CRP HIGH SENSITIVE  (CARDIAC)    Collection Time: 03/12/18  3:30 AM   Result Value Ref Range    C Reactive Protein High Sensitive 3.0 0.0 - 7.5 mg/L     Results     ** No results found for the last 168 hours. **        Hemodynamics:  No data recorded.     No Data Recorded            Wound:        Fluids:  Intake/Output     None           GI/Nutrition:  Orders Placed This Encounter   Procedures   • DIET ORDER     Standing Status:   Standing     Number of Occurrences:   1     Order Specific Question:   Diet:     Answer:   Regular [1]     Order Specific Question:   Consistency/Fluid modifications:     Answer:   Thin Liquids [3]     Medications:  No current facility-administered medications for this encounter.      Medical Decision Making, by Problem:  1. Sepsis 2/2 an acute intra abdominal abscess with peritonitis.             - Sepsis portion resolved.            - Organisms: Enterococcus, Klebsiella, Pseudomonas, MSSA, Bacteroides.             - Associated with Bacteroides Bacteremia       2. S/p Lower anterior resection of the sigmoid colon with colostomy             - 2/2 a stricture caused by diverticular chronic inflammation.  3. Status post revision colostomy for ischemic colostomy areas with necrosis.  4. Anemia of chronic disease  5. Thombocytosis resolved.  6. Hypersensitivity leukopenia/neutropenia secondary to piperacillin-tazobactam-resolved  7. Right lateral abd pain with movement, likely Muscular           Plan:  1. Continue Zosyn/Fluconazole, d/c'ing today after last doses - 28 days of therapy.    2. Continue wound care.   3. Encourage OOB, ambulate, in preparation for home later this week?       He is doing well and can stop all antibiotics after today and be observed. He has no subjective or objective signs of infection. His labs have all returned to normal or are returning to normal. I reviewed potential D/C plans with the patient and his wife along with RN ~ 30+ min on floor in direct patient care/counseling and D/C  planning today. We will F/U with the patient as an outpatient in 2 weeks once discharged.

## 2018-03-13 ASSESSMENT — ENCOUNTER SYMPTOMS
CHILLS: 0
COUGH: 0
DIARRHEA: 0
FEVER: 0
WEAKNESS: 0
MYALGIAS: 0
ABDOMINAL PAIN: 0
SHORTNESS OF BREATH: 0
VOMITING: 0
NAUSEA: 0
CONSTIPATION: 0

## 2018-03-13 NOTE — PROGRESS NOTES
Infectious Disease Progress Note    Author: Gabriel Martini M.D. Date & Time created: 3/13/2018  9:41 AM     Zosyn stopped 3/4 at 15 days, restarted 3/6.    Fluconazole stopped 3/12 after 22 days.      - Prev micafungin at [facility]. Anidulafungin substituted at [facility], then changed to fluconazole 2/23.    Completed Abx day #28    2/1: Lower Anterior resection of the sigmoid colon with colostomy secondary to a stricture caused by diverticular chronic inflammation.  2/9: Abd/CT: Inflammation of the colon at and just proximal to the colostomy suggests an infection or ischemia. Suspected 5.5x7.6 cm abscess medial to the distal colon and colostomy site. Partial intestinal obstruction.   2/10: Ex Lap. Colon resection. Ostomy revision. Abscess drainage.  2.17: CT Ab/Pelv:  Interval resolution of obstruction. Substantial decrease in size of fluid collection in the central abdomen since the prior exam. Residual thin collection in the ventral right abdomen measuring 5.3 x 1.0 cm. New small collections in the left abdomen adjacent to the drain and in the pelvis.  2/17: Blood Culture: NGTD  2/18: IR placed drain.  2/19: Wound Vac Change.   2/20: Transfer to [facility].  2/21: RN notes purulent bloody drainage in CHUCK.  Only 5 cc documented in past 24 hours.  2/22: Little no no drainage out of CHUCK.  2/24: CHUCK output 15 cc/24 hours, Accordion 40 cc/24 hours.  Taking oxy IR for pain control.  Still weak.  2/25: CHUCK output 0 the past 24 hours, Accordion 25 cc.  IR placed drains 2/18.  2/26: Up walking with FWW in the hallways.  CHUCK output 7 cc, Accordian 0  2/27: Afebrile and feeling well. Mild lower abdominal discomfort. WBC 5600, ESR 53.  2/28: Ambulating in hallway with FWW.  Minimal output from drains.  3/1:   Remaining afebrile. Feeling well but still has some moderate midabdominal pain.  3/2: CT shows resolution of intraabdominal abscesses.  3/3: Remaining afebrile.  3/4: Remaining afebrile but now leukopenic at 1900.  ESR 45 down from  80. CRP 0.2 down from 1.28.  3/5: Remaining afebrile but WBC 2900 with ANC of 240 and eosinophilia of 11.6%.  3/6: No new complaints today - labs redrawn and pending.  3/7: WBC has rebounded to 10,700. ANC 7380 post G-CSF.  Back on piperacillin-tazobactam yesterday.  3/8: He is doing well and making good progress treat with antibiotic to 28 total days.   3/9: Afebrile. WBC 7100. ANC 3870. Expects to be discharged Monday.  3/10 ANC 3220. Afebrile.  3/12: He is doing well and finishes antibiotics today.  3/13: PICC out.    Interval History:  Past 24 hrs reviewed with RN. No issues overnight. No fevers. PICC out. Dispo ready for today.    Labs Reviewed, Medications Reviewed, Radiology Reviewed, Wound Reviewed, Fluids Reviewed and GI Nutrition Reviewed    Review of Systems:  Review of Systems   Constitutional: Negative for chills and fever.   Respiratory: Negative for cough and shortness of breath.    Cardiovascular: Negative for chest pain.   Gastrointestinal: Negative for abdominal pain, constipation, diarrhea, nausea and vomiting.   Genitourinary: Negative for dysuria, frequency and urgency.   Musculoskeletal: Negative for myalgias.   Skin: Negative for itching and rash.   Neurological: Negative for weakness.       Physical Exam:  T98.6 P70 RR18 /77 Sat97% on RA  Physical Exam   Constitutional: He is oriented to person, place, and time. He appears well-developed and well-nourished. He is cooperative.   Cardiovascular: Normal rate and regular rhythm.    Pulmonary/Chest: Effort normal. No respiratory distress. He has no wheezes.   Abdominal: Soft. He exhibits no distension. There is no tenderness. There is no rebound and no guarding.   Neurological: He is alert and oriented to person, place, and time.   Skin: Skin is warm and dry.   Psychiatric: He has a normal mood and affect. His behavior is normal.   Nursing note and vitals reviewed.      Labs:  No results found for this or any previous visit (from the past 24  hour(s)).    GI/Nutrition:  Orders Placed This Encounter   Procedures   • DIET ORDER     Standing Status:   Standing     Number of Occurrences:   1     Order Specific Question:   Diet:     Answer:   Regular [1]     Order Specific Question:   Consistency/Fluid modifications:     Answer:   Thin Liquids [3]     Medical Decision Making, by Problem:  1. Sepsis 2/2 an acute intra abdominal abscess with peritonitis.             - Sepsis portion resolved.            - Organisms: Enterococcus, Klebsiella, Pseudomonas, MSSA, Bacteroides.             - Associated with Bacteroides Bacteremia       2. S/p Lower anterior resection of the sigmoid colon with colostomy             - 2/2 a stricture caused by diverticular chronic inflammation.  3. Status post revision colostomy for ischemic colostomy areas with necrosis.  4. Anemia of chronic disease  5. Thombocytosis resolved.  6. Hypersensitivity leukopenia/neutropenia secondary to piperacillin-tazobactam-resolved  7. Right lateral abd pain with movement, likely Muscular     Plan:  1. Continue Zosyn/Fluconazole - s/p 28 days of therapy.    2. Continue wound maintainence  3. Encourage OOB, ambulate  4. Dispo today. We will F/U with the patient as an outpatient in 2 weeks once discharged.  5. ~ 25+ min on floor in direct patient care/counseling. Over 50% of that time was in direct care.    Thank you for this consult. We will continue to follow along with you.    Dr Martini

## 2018-04-09 ENCOUNTER — OFFICE VISIT (OUTPATIENT)
Dept: INTERNAL MEDICINE | Facility: MEDICAL CENTER | Age: 58
End: 2018-04-09
Payer: COMMERCIAL

## 2018-04-09 VITALS
DIASTOLIC BLOOD PRESSURE: 81 MMHG | TEMPERATURE: 97.5 F | HEART RATE: 73 BPM | OXYGEN SATURATION: 96 % | BODY MASS INDEX: 24.8 KG/M2 | SYSTOLIC BLOOD PRESSURE: 114 MMHG | WEIGHT: 140 LBS | HEIGHT: 63 IN

## 2018-04-09 DIAGNOSIS — Z93.3 COLOSTOMY IN PLACE (HCC): ICD-10-CM

## 2018-04-09 DIAGNOSIS — M54.5 BILATERAL LOW BACK PAIN, UNSPECIFIED CHRONICITY, WITH SCIATICA PRESENCE UNSPECIFIED: ICD-10-CM

## 2018-04-09 PROBLEM — M54.50 BILATERAL LOW BACK PAIN: Status: ACTIVE | Noted: 2018-04-09

## 2018-04-09 PROCEDURE — 99204 OFFICE O/P NEW MOD 45 MIN: CPT | Mod: GC | Performed by: INTERNAL MEDICINE

## 2018-04-09 RX ORDER — ACETAMINOPHEN 500 MG
500-1000 TABLET ORAL EVERY 6 HOURS PRN
COMMUNITY

## 2018-04-09 ASSESSMENT — PATIENT HEALTH QUESTIONNAIRE - PHQ9: CLINICAL INTERPRETATION OF PHQ2 SCORE: 0

## 2018-04-09 NOTE — PATIENT INSTRUCTIONS
Dolor de espalda en adultos  (Back Pain, Adult)  El dolor de espalda es muy frecuente en los adultos. La causa del dolor de espalda es marissa vez peligrosa y el dolor a menudo mejora con el tiempo. Es posible que se desconozca la causa de esta afección. Algunas causas comunes son las siguientes:  · Distensión de los músculos o ligamentos que sostienen la columna vertebral.  · Desgaste (degeneración) de los discos vertebrales.  · Artritis.  · Lesiones directas en la espalda.  En muchas personas, el dolor de espalda es recurrente. Urich marissa vez es peligroso, las personas pueden aprender a manejar esta afección por sí mismas.  INSTRUCCIONES PARA EL CUIDADO EN EL HOGAR  Controle whiting dolor de espalda a fin de detectar algún cambio. Las siguientes indicaciones ayudarán a aliviar cualquier molestia que pueda sentir:  · Permanezca activo. Si permanece sentado o de pie en un mismo lugar terrence mucho tiempo, se tensiona la espalda. No se siente, conduzca o permanezca de pie en un mismo lugar terrence más de 30 minutos seguidos. Realice caminatas cortas en superficies ramon tan pronto rissa le sea posible. Trate de caminar un poco más de tiempo cada día.  · Williams ejercicio regularmente rissa se lo haya indicado el médico. El ejercicio ayuda a que whiting espalda se cure más rápidamente. También ayuda a prevenir futuras lesiones al mantener los músculos baron y flexibles.  · No permanezca en la cama. Si hace reposo más de 1 a 2 días, puede demorar whiting recuperación.  · Preste atención a whiting cuerpo al inclinarse y levantarse. Las posiciones más cómodas son las que ejercen menos tensión en la espalda en recuperación. Siempre use técnicas apropiadas para levantar objetos, rissa por ejemplo:  ¨ Flexionar las rodillas.  ¨ Mantener la carga cerca del cuerpo.  ¨ No torcerse.  · Encuentre sai posición cómoda para dormir. Use un colchón firme y recuéstese de costado con las rodillas ligeramente flexionadas. Si se recuesta sobre la espalda, coloque  sai almohada debajo de las rodillas.  · Evite sentir ansiedad o estrés. El estrés aumenta la tensión muscular y puede empeorar el dolor de espalda. Es importante reconocer si se siente ansioso o estresado y aprender maneras de controlarlo, por ejemplo haciendo ejercicio.  · Zion los medicamentos solamente rissa se lo haya indicado el médico. Los medicamentos de venta tiara para aliviar el dolor y la inflamación a menudo son los más eficaces. El médico puede recetarle relajantes musculares. Estos medicamentos ayudan a calmar el dolor de modo que pueda reanudar más rápidamente cholo actividades normales y el ejercicio saludable.  · Aplique hielo sobre la tee lesionada.  ¨ Ponga el hielo en sai bolsa plástica.  ¨ Coloque sai toalla entre la piel y la bolsa de hielo.  ¨ Deje el hielo terrence 20 minutos, 2 a 3 veces por día, terrence los primeros 2 o 3 días. Después de eso, puede alternar el hielo y el calor para reducir el dolor y los espasmos.  · Mantenga un peso saludable. El exceso de peso ejerce presión adicional sobre la espalda y hace que resulte difícil mantener sai buena postura.  SOLICITE ATENCIÓN MÉDICA SI:  · Siente un dolor que no se jeet con reposo o medicamentos.  · Siente mucho dolor que se extiende a las piernas o los glúteos.  · El dolor no mejora en sai semana.  · Siente dolor por la noche.  · Pierde peso.  · Siente escalofríos o fiebre.  SOLICITE ATENCIÓN MÉDICA DE INMEDIATO SI:  · Tiene nuevos problemas para controlar la vejiga o los intestinos.  · Siente debilidad o adormecimiento inusuales en los brazos o en las piernas.  · Siente náuseas o vómitos.  · Siente dolor abdominal.  · Siente que va a desmayarse.  Esta información no tiene rissa fin reemplazar el consejo del médico. Asegúrese de hacerle al médico cualquier pregunta que tenga.  Document Released: 12/18/2006 Document Revised: 01/08/2016 Document Reviewed: 04/21/2015  [employer]vier Interactive Patient Education © 2017 [employer]

## 2018-04-09 NOTE — PROGRESS NOTES
New Patient to Establish    Reason to establish: New patient to establish    CC: establish care.    HPI: Mr. Lira is a 47 year old gentleman with past medical history significant for colostomy placement who presents today to establish care.  History is taken through a [facility] .     Colostomy in place, lower back pain: Patient states in February 2018 he was found to have a large intestinal ?mass/infection that eventually required reversible colostomy (done at [facility]).  He reports dull lower back pain since then, which he believes is due to lying in the hospital for 2 weeks. He states the pain is improving overall, but still present.  Denies bowel/bladder incontinence, lower extremity weakness.      Hot feeling while lying down: Patient also states, since his surgery, 15 minutes after he lies down at night and falls asleep, he experiences a hot feeling throughout his torso.  He states this hot feeling wakes him up and he is unable to sleep for 2-3 hours afterward. This only happens when he tries to sleep at night; does not happen during the day nor when he falls asleep sitting up on the couch.  Denies night sweats, fever, flushing.  Denies decreased libido, erectile dysfunction, symptoms of depression.        Patient Active Problem List    Diagnosis Date Noted   • Colostomy in place (CMS-HCC) 04/09/2018   • Bilateral low back pain 04/09/2018       Past Medical History:   Diagnosis Date   • Colostomy in place (CMS-HCC)        Current Outpatient Prescriptions   Medication Sig Dispense Refill   • acetaminophen (TYLENOL) 500 MG Tab Take 500-1,000 mg by mouth every 6 hours as needed.       No current facility-administered medications for this visit.        Allergies as of 04/09/2018   • (No Known Allergies)       Social History     Social History   • Marital status:      Spouse name: N/A   • Number of children: N/A   • Years of education: N/A     Occupational History  "  • Not on file.     Social History Main Topics   • Smoking status: Never Smoker   • Smokeless tobacco: Never Used   • Alcohol use Yes      Comment: occasionally   • Drug use: No   • Sexual activity: Not on file     Other Topics Concern   • Not on file     Social History Narrative   • No narrative on file       Family History   Problem Relation Age of Onset   • Cancer Neg Hx    • Heart Attack Neg Hx    • Stroke Neg Hx    • Diabetes Neg Hx        Past Surgical History:   Procedure Laterality Date   • COLOSTOMY  02/2018   • HERNIA REPAIR         ROS: 12 point review of systems negative except as per HPI and below.    General: positive for hot feeling throughout torso during recumbent nighttime sleep.  MSK: positive for lower back pain.        /81   Pulse 73   Temp 36.4 °C (97.5 °F)   Ht 1.6 m (5' 3\")   Wt 63.5 kg (140 lb)   SpO2 96%   BMI 24.80 kg/m²     Physical Exam  General:  Alert and oriented, No apparent distress.    Eyes: Pupils equal and reactive. No scleral icterus.    Throat: Clear no erythema or exudates noted.    Neck: Supple. No lymphadenopathy noted. Thyroid not enlarged.    Lungs: Clear to auscultation and percussion bilaterally.    Cardiovascular: Regular rate and rhythm. No murmurs, rubs or gallops.    Abdomen:  Benign. No rebound or guarding noted.    Musculoskeletal: No spinal point tenderness.  No paraspinal tenderness to palpation.       Extremities: No clubbing, cyanosis, edema.    Skin: Clear. No rash or suspicious skin lesions noted.      Note: I have reviewed all pertinent labs and diagnostic tests associated with this visit with specific comments listed under the assessment and plan below    Assessment and Plan    1. Colostomy in place   2. Hot feeling during recumbent sleep   - Patient states in February 2018 he was found to have a large intestine ?mass/infection, which required reversible colostomy (done at [facility]).  Reversal date to be determined by Surgery.    - " Patient also states since his surgery, he experiences a hot feeling throughout his torso 15 minutes after lying down and sleeping at night.  Hot feeling only occurs during recumbent sleep at night; does not happen during the day or when he falls asleep sitting up.  Denies night sweats, fever, flushing.  Denies decreased libido, erectile dysfunction, symptoms of depression.    - Obtain records from [facility].    - Patient to follow up with his surgeon re: colostomy reversal.  He has an appointment later this month.   - Hot feeling in torso area may be due to recent surgery.  Does not appear to be male menopause and is not presently concerning for infection given above pertinent negatives.  Appears to be positional/related to recent colostomy placement.  Patient to try sleeping at an inclined angle to see if this improves hot feeling.  Will also monitor for improvement after colostomy reversal.      3. Bilateral low back pain  - Patient with dull lower back pain x2 months.  Began during February 2018 hospitalization.  Neurologically intact.  No point tenderness.  Pain improving overall.  - Referral to Physical Therapy ordered.  Will monitor.     4. Healthcare Maintenance  - Colonoscopy: done February 2018 before surgery.       Followup: Return in about 10 weeks (around 6/18/2018).    Signed by: Gracie Crocker M.D.

## 2018-07-30 ENCOUNTER — HOSPITAL ENCOUNTER (OUTPATIENT)
Dept: HOSPITAL 8 - CFH | Age: 58
Discharge: HOME | End: 2018-07-30
Attending: SURGERY
Payer: COMMERCIAL

## 2018-07-30 DIAGNOSIS — C10.9: Primary | ICD-10-CM

## 2018-07-30 DIAGNOSIS — K80.20: ICD-10-CM

## 2018-07-30 PROCEDURE — 74177 CT ABD & PELVIS W/CONTRAST: CPT

## 2018-09-20 ENCOUNTER — HOSPITAL ENCOUNTER (INPATIENT)
Dept: HOSPITAL 8 - ORIP | Age: 58
LOS: 2 days | Discharge: HOME | DRG: 334 | End: 2018-09-22
Attending: SURGERY | Admitting: SURGERY
Payer: COMMERCIAL

## 2018-09-20 VITALS — HEIGHT: 64 IN | WEIGHT: 148.15 LBS | BODY MASS INDEX: 25.29 KG/M2

## 2018-09-20 DIAGNOSIS — Z72.89: ICD-10-CM

## 2018-09-20 DIAGNOSIS — K66.0: ICD-10-CM

## 2018-09-20 DIAGNOSIS — Z43.3: Primary | ICD-10-CM

## 2018-09-20 PROCEDURE — 0DSL4ZZ REPOSITION TRANSVERSE COLON, PERCUTANEOUS ENDOSCOPIC APPROACH: ICD-10-PCS | Performed by: SURGERY

## 2018-09-20 PROCEDURE — 80048 BASIC METABOLIC PNL TOTAL CA: CPT

## 2018-09-20 PROCEDURE — 8E0W4CZ ROBOTIC ASSISTED PROCEDURE OF TRUNK REGION, PERCUTANEOUS ENDOSCOPIC APPROACH: ICD-10-PCS | Performed by: SURGERY

## 2018-09-20 PROCEDURE — 88307 TISSUE EXAM BY PATHOLOGIST: CPT

## 2018-09-20 PROCEDURE — 93005 ELECTROCARDIOGRAM TRACING: CPT

## 2018-09-20 PROCEDURE — 36415 COLL VENOUS BLD VENIPUNCTURE: CPT

## 2018-09-20 PROCEDURE — S0074 INJECTION, CEFOTETAN DISODIU: HCPCS

## 2018-09-20 PROCEDURE — 0DNU4ZZ RELEASE OMENTUM, PERCUTANEOUS ENDOSCOPIC APPROACH: ICD-10-PCS | Performed by: SURGERY

## 2018-09-20 PROCEDURE — 0DBP4ZZ EXCISION OF RECTUM, PERCUTANEOUS ENDOSCOPIC APPROACH: ICD-10-PCS | Performed by: SURGERY

## 2018-09-20 PROCEDURE — 86850 RBC ANTIBODY SCREEN: CPT

## 2018-09-20 PROCEDURE — 85025 COMPLETE CBC W/AUTO DIFF WBC: CPT

## 2018-09-20 PROCEDURE — 80053 COMPREHEN METABOLIC PANEL: CPT

## 2018-09-20 PROCEDURE — 86900 BLOOD TYPING SEROLOGIC ABO: CPT

## 2018-09-20 PROCEDURE — 82040 ASSAY OF SERUM ALBUMIN: CPT

## 2018-09-20 PROCEDURE — 83735 ASSAY OF MAGNESIUM: CPT

## 2018-09-20 PROCEDURE — 88304 TISSUE EXAM BY PATHOLOGIST: CPT

## 2018-09-20 RX ADMIN — ACETAMINOPHEN SCH MG: 500 TABLET, COATED ORAL at 22:31

## 2018-09-20 RX ADMIN — KETOROLAC TROMETHAMINE SCH MG: 30 INJECTION, SOLUTION INTRAMUSCULAR at 22:30

## 2018-09-21 VITALS — DIASTOLIC BLOOD PRESSURE: 78 MMHG | SYSTOLIC BLOOD PRESSURE: 131 MMHG

## 2018-09-21 VITALS — SYSTOLIC BLOOD PRESSURE: 118 MMHG | DIASTOLIC BLOOD PRESSURE: 61 MMHG

## 2018-09-21 VITALS — DIASTOLIC BLOOD PRESSURE: 69 MMHG | SYSTOLIC BLOOD PRESSURE: 105 MMHG

## 2018-09-21 VITALS — SYSTOLIC BLOOD PRESSURE: 124 MMHG | DIASTOLIC BLOOD PRESSURE: 82 MMHG

## 2018-09-21 VITALS — SYSTOLIC BLOOD PRESSURE: 118 MMHG | DIASTOLIC BLOOD PRESSURE: 72 MMHG

## 2018-09-21 LAB
ALBUMIN SERPL-MCNC: 3.1 G/DL (ref 3.4–5)
ANION GAP SERPL CALC-SCNC: 7 MMOL/L (ref 5–15)
BASOPHILS # BLD AUTO: 0 X10^3/UL (ref 0–0.1)
BASOPHILS NFR BLD AUTO: 0 % (ref 0–1)
CALCIUM SERPL-MCNC: 8.2 MG/DL (ref 8.5–10.1)
CHLORIDE SERPL-SCNC: 106 MMOL/L (ref 98–107)
CREAT SERPL-MCNC: 0.91 MG/DL (ref 0.7–1.3)
EOSINOPHIL # BLD AUTO: 0 X10^3/UL (ref 0–0.4)
EOSINOPHIL NFR BLD AUTO: 0 % (ref 1–7)
ERYTHROCYTE [DISTWIDTH] IN BLOOD BY AUTOMATED COUNT: 18.6 % (ref 9.4–14.8)
LYMPHOCYTES # BLD AUTO: 0.63 X10^3/UL (ref 1–3.4)
LYMPHOCYTES NFR BLD AUTO: 7 % (ref 22–44)
MCH RBC QN AUTO: 25 PG (ref 27.5–34.5)
MCHC RBC AUTO-ENTMCNC: 32.7 G/DL (ref 33.2–36.2)
MCV RBC AUTO: 76.4 FL (ref 81–97)
MD: NO
MONOCYTES # BLD AUTO: 0.64 X10^3/UL (ref 0.2–0.8)
MONOCYTES NFR BLD AUTO: 8 % (ref 2–9)
NEUTROPHILS # BLD AUTO: 7.27 X10^3/UL (ref 1.8–6.8)
NEUTROPHILS NFR BLD AUTO: 85 % (ref 42–75)
PLATELET # BLD AUTO: 260 X10^3/UL (ref 130–400)
PMV BLD AUTO: 7.7 FL (ref 7.4–10.4)
RBC # BLD AUTO: 4.85 X10^6/UL (ref 4.38–5.82)

## 2018-09-21 RX ADMIN — KETOROLAC TROMETHAMINE SCH MG: 30 INJECTION, SOLUTION INTRAMUSCULAR at 23:08

## 2018-09-21 RX ADMIN — OXYCODONE HYDROCHLORIDE PRN MG: 5 TABLET ORAL at 14:31

## 2018-09-21 RX ADMIN — KETOROLAC TROMETHAMINE SCH MG: 30 INJECTION, SOLUTION INTRAMUSCULAR at 15:38

## 2018-09-21 RX ADMIN — KETOROLAC TROMETHAMINE SCH MG: 30 INJECTION, SOLUTION INTRAMUSCULAR at 09:27

## 2018-09-21 RX ADMIN — KETOROLAC TROMETHAMINE SCH MG: 30 INJECTION, SOLUTION INTRAMUSCULAR at 04:02

## 2018-09-21 RX ADMIN — OXYCODONE HYDROCHLORIDE PRN MG: 5 TABLET ORAL at 23:08

## 2018-09-21 RX ADMIN — ACETAMINOPHEN SCH MG: 500 TABLET, COATED ORAL at 04:02

## 2018-09-21 RX ADMIN — ACETAMINOPHEN SCH MG: 500 TABLET, COATED ORAL at 23:08

## 2018-09-21 RX ADMIN — OXYCODONE HYDROCHLORIDE PRN MG: 5 TABLET ORAL at 05:56

## 2018-09-21 RX ADMIN — ENOXAPARIN SODIUM SCH MG: 40 INJECTION SUBCUTANEOUS at 07:56

## 2018-09-21 RX ADMIN — SODIUM CHLORIDE, SODIUM LACTATE, POTASSIUM CHLORIDE, AND CALCIUM CHLORIDE SCH MLS/HR: .6; .31; .03; .02 INJECTION, SOLUTION INTRAVENOUS at 09:28

## 2018-09-21 RX ADMIN — ACETAMINOPHEN SCH MG: 500 TABLET, COATED ORAL at 15:38

## 2018-09-21 RX ADMIN — ACETAMINOPHEN SCH MG: 500 TABLET, COATED ORAL at 09:27

## 2018-09-22 VITALS — SYSTOLIC BLOOD PRESSURE: 101 MMHG | DIASTOLIC BLOOD PRESSURE: 64 MMHG

## 2018-09-22 VITALS — SYSTOLIC BLOOD PRESSURE: 117 MMHG | DIASTOLIC BLOOD PRESSURE: 78 MMHG

## 2018-09-22 VITALS — SYSTOLIC BLOOD PRESSURE: 115 MMHG | DIASTOLIC BLOOD PRESSURE: 81 MMHG

## 2018-09-22 LAB
ANION GAP SERPL CALC-SCNC: 9 MMOL/L (ref 5–15)
BASOPHILS # BLD AUTO: 0.03 X10^3/UL (ref 0–0.1)
BASOPHILS NFR BLD AUTO: 0 % (ref 0–1)
CALCIUM SERPL-MCNC: 8.1 MG/DL (ref 8.5–10.1)
CHLORIDE SERPL-SCNC: 106 MMOL/L (ref 98–107)
CREAT SERPL-MCNC: 0.75 MG/DL (ref 0.7–1.3)
EOSINOPHIL # BLD AUTO: 0.13 X10^3/UL (ref 0–0.4)
EOSINOPHIL NFR BLD AUTO: 2 % (ref 1–7)
ERYTHROCYTE [DISTWIDTH] IN BLOOD BY AUTOMATED COUNT: 19.2 % (ref 9.4–14.8)
LYMPHOCYTES # BLD AUTO: 1.39 X10^3/UL (ref 1–3.4)
LYMPHOCYTES NFR BLD AUTO: 20 % (ref 22–44)
MCH RBC QN AUTO: 25.1 PG (ref 27.5–34.5)
MCHC RBC AUTO-ENTMCNC: 32.8 G/DL (ref 33.2–36.2)
MCV RBC AUTO: 76.5 FL (ref 81–97)
MD: NO
MONOCYTES # BLD AUTO: 0.62 X10^3/UL (ref 0.2–0.8)
MONOCYTES NFR BLD AUTO: 9 % (ref 2–9)
NEUTROPHILS # BLD AUTO: 4.8 X10^3/UL (ref 1.8–6.8)
NEUTROPHILS NFR BLD AUTO: 69 % (ref 42–75)
PLATELET # BLD AUTO: 242 X10^3/UL (ref 130–400)
PMV BLD AUTO: 7.7 FL (ref 7.4–10.4)
RBC # BLD AUTO: 4.79 X10^6/UL (ref 4.38–5.82)

## 2018-09-22 RX ADMIN — SODIUM CHLORIDE, SODIUM LACTATE, POTASSIUM CHLORIDE, AND CALCIUM CHLORIDE SCH MLS/HR: .6; .31; .03; .02 INJECTION, SOLUTION INTRAVENOUS at 06:30

## 2018-09-22 RX ADMIN — ACETAMINOPHEN SCH MG: 500 TABLET, COATED ORAL at 06:15

## 2018-09-22 RX ADMIN — KETOROLAC TROMETHAMINE SCH MG: 30 INJECTION, SOLUTION INTRAMUSCULAR at 12:00

## 2018-09-22 RX ADMIN — ACETAMINOPHEN SCH MG: 500 TABLET, COATED ORAL at 12:32

## 2018-09-22 RX ADMIN — KETOROLAC TROMETHAMINE SCH MG: 30 INJECTION, SOLUTION INTRAMUSCULAR at 06:15

## 2018-09-22 RX ADMIN — ENOXAPARIN SODIUM SCH MG: 40 INJECTION SUBCUTANEOUS at 07:52

## 2021-03-15 DIAGNOSIS — Z23 NEED FOR VACCINATION: ICD-10-CM

## 2022-09-09 ENCOUNTER — HOSPITAL ENCOUNTER (OUTPATIENT)
Dept: RADIOLOGY | Facility: MEDICAL CENTER | Age: 62
End: 2022-09-09
Attending: NURSE PRACTITIONER
Payer: COMMERCIAL

## 2022-09-09 DIAGNOSIS — N50.89 TESTICULAR SWELLING, LEFT: ICD-10-CM

## 2025-03-02 NOTE — PROGRESS NOTES
Infectious Disease Progress Note    Author: Shane Fuentes Date & Time created: 3/8/2018  7:17 AM     Zosyn discontinued 3/4 at 15 days. Restarted 3/6.  Fluconazole Day #19 (prev micafungin at [facility]/ anidulafunginsubstituted at [facility], then changed to fluconazole 2/23) (started 2/15)   Total Abx day #24.     2/1: Lower Anterior resection of the sigmoid colon with colostomy secondary to a stricture caused by diverticular chronic inflammation.  2/9: Abd/CT: Inflammation of the colon at and just proximal to the colostomy suggests an infection or ischemia. Suspected 5.5x7.6 cm abscess medial to the distal colon and colostomy site. Partial intestinal obstruction.   2/10: Ex Lap. Colon resection. Ostomy revision. Abscess drainage.  2.17: CT Ab/Pelv:  Interval resolution of obstruction. Substantial decrease in size of fluid collection in the central abdomen since the prior exam. Residual thin collection in the ventral right abdomen measuring 5.3 x 1.0 cm. New small collections in the left abdomen adjacent to the drain and in the pelvis.  2/17: Blood Culture: NGTD  2/18: IR placed drain.  2/19: Wound Vac Change.   2/20: Transfer to [facility].  2/21: RN notes purulent bloody drainage in CHUCK.  Only 5 cc documented in past 24 hours.  2/22: Little no no drainage out of CHUCK.  2/24: CHUCK output 15 cc/24 hours, Accordion 40 cc/24 hours.  Taking oxy IR for pain control.  Still weak.  2/25: CHUCK output 0 the past 24 hours, Accordion 25 cc.  IR placed drains 2/18.  2/26: Up walking with FWW in the hallways.  CHUCK output 7 cc, Accordian 0  2/27: Afebrile and feeling well. Mild lower abdominal discomfort. WBC 5600, ESR 53.  2/28: Ambulating in hallway with FWW.  Minimal output from drains.  3/1:   Remaining afebrile. Feeling well but still has some moderate midabdominal pain.  3/2: CT shows resolution of intraabdominal abscesses.  3/3: Remaining afebrile.  3/4: Remaining afebrile but now leukopenic at 1900.  ESR 45 down from 80. CRP 0.2 down from  1.28.  3/5: Remaining afebrile but WBC 2900 with ANC of 240 and eosinophilia of 11.6%.  3/6: No new complaints today - labs redrawn and pending.  3/7: WBC has rebounded to 10,700. ANC 7380 post G-CSF.  Back on piperacillin-tazobactam yesterday.  3/8: He is doing well and making good progress treat with antibiotic to 28 total days.    Interval History:  Past 24 hrs reviewed with RN.    Labs Reviewed, Medications Reviewed, Radiology Reviewed, Wound Reviewed, Fluids Reviewed and GI Nutrition Reviewed    Temp.    Max - 98.9    Now - 98.0    Review of Systems:  Review of Systems   Constitutional: Negative for chills and fever.   Respiratory: Negative for cough and shortness of breath.    Cardiovascular: Negative for chest pain.   Gastrointestinal: Negative for abdominal pain, constipation, diarrhea, nausea and vomiting.   Genitourinary: Negative for dysuria, frequency and urgency.   Skin: Negative for itching and rash.       Physical Exam:  Physical Exam   Constitutional: He is oriented to person, place, and time. He appears well-developed.   HENT:   Head: Normocephalic and atraumatic.   Cardiovascular: Normal rate and regular rhythm.    Pulmonary/Chest: Effort normal. No respiratory distress. He has no wheezes.   Abdominal: Soft. He exhibits no distension. There is no tenderness. There is no rebound and no guarding.   Neurological: He is alert and oriented to person, place, and time.   Skin: Skin is warm and dry.   Psychiatric: He has a normal mood and affect. His behavior is normal.   Nursing note and vitals reviewed.      Labs:  No results found for this or any previous visit (from the past 24 hour(s)).  Results     ** No results found for the last 168 hours. **        Hemodynamics:  No data recorded.     No Data Recorded            Wound:        Fluids:  Intake/Output     None           GI/Nutrition:  Orders Placed This Encounter   Procedures   • DIET ORDER     Standing Status:   Standing     Number of Occurrences:    1     Order Specific Question:   Diet:     Answer:   Regular [1]     Comments:   Neutropenic Diet     Order Specific Question:   Consistency/Fluid modifications:     Answer:   Thin Liquids [3]     Medications:  No current facility-administered medications for this encounter.      Medical Decision Making, by Problem:  1. Sepsis 2/2 an acute intra abdominal abscess with peritonitis.             - Sepsis portion resolved.            - Organisms: Enterococcus, Klebsiella, Pseudomonas, MSSA, Bacteroides.             - Associated with Bacteroides Bacteremia       2. S/p Lower anterior resection of the sigmoid colon with colostomy             - 2/2 a stricture caused by diverticular chronic inflammation.  3. Status post revision colostomy for ischemic colostomy areas with necrosis.  4. Anemia of chronic disease  5. Thombocytosis resolved.  6. Hypersensitivity leukopenia/neutropenia secondary to piperacillin-tazobactam.     Plan:  1. Continue fluconazole for now.   2. Monitor WBC & ANC - G- mcg prn ANC below 1000.  3. Continue wound care.  4. Continue PT  5. Treating with Zosyn another 4 days to complete therapy.    He is looking good and his ANC has been up with G-CSF re check in AM. His last CT showed resolution of his abscesses and minimal inflammatory changes and that was 6 days ago. We will treat with Zosyn to complete 28 total days of therapy then D/C antibiotics and observe.       generalized